# Patient Record
Sex: FEMALE | Race: WHITE | NOT HISPANIC OR LATINO | Employment: OTHER | URBAN - METROPOLITAN AREA
[De-identification: names, ages, dates, MRNs, and addresses within clinical notes are randomized per-mention and may not be internally consistent; named-entity substitution may affect disease eponyms.]

---

## 2017-10-16 ENCOUNTER — OFFICE VISIT (OUTPATIENT)
Dept: OBSTETRICS AND GYNECOLOGY | Age: 57
End: 2017-10-16

## 2017-10-16 VITALS
DIASTOLIC BLOOD PRESSURE: 86 MMHG | SYSTOLIC BLOOD PRESSURE: 122 MMHG | WEIGHT: 143 LBS | BODY MASS INDEX: 20.47 KG/M2 | HEIGHT: 70 IN

## 2017-10-16 DIAGNOSIS — Z01.419 WELL WOMAN EXAM WITH ROUTINE GYNECOLOGICAL EXAM: Primary | ICD-10-CM

## 2017-10-16 DIAGNOSIS — N90.89 LABIAL LESION: ICD-10-CM

## 2017-10-16 DIAGNOSIS — Z11.51 SPECIAL SCREENING EXAMINATION FOR HUMAN PAPILLOMAVIRUS (HPV): ICD-10-CM

## 2017-10-16 LAB
BILIRUB BLD-MCNC: NEGATIVE MG/DL
GLUCOSE UR STRIP-MCNC: NEGATIVE MG/DL
KETONES UR QL: NEGATIVE
LEUKOCYTE EST, POC: NEGATIVE
NITRITE UR-MCNC: NEGATIVE MG/ML
PH UR: 7.5 [PH] (ref 5–8)
PROT UR STRIP-MCNC: NEGATIVE MG/DL
RBC # UR STRIP: NEGATIVE /UL
SP GR UR: 1.02 (ref 1–1.03)
UROBILINOGEN UR QL: NORMAL

## 2017-10-16 PROCEDURE — 81003 URINALYSIS AUTO W/O SCOPE: CPT | Performed by: OBSTETRICS & GYNECOLOGY

## 2017-10-16 PROCEDURE — 99386 PREV VISIT NEW AGE 40-64: CPT | Performed by: OBSTETRICS & GYNECOLOGY

## 2017-10-16 NOTE — PROGRESS NOTES
Routine Annual Visit    10/16/2017    Patient: Xochilt Whittington          MR#:[id number]      Chief Complaint   Patient presents with   • Gynecologic Exam     Xochilt is here for her Annual Exam and Mammogram. She recently moved back from [place].She had pap smear in [place] and was positive for HPV.  She also had heavy cycles and had bx done which was negative.   Her last pap smear at our office was , neg pap, neg HR-HPV. Hx of Ascus in .        History of Present Illness  Recently moved back from [place].  She had a pap smear that was positive for HPV. Also, she was having heavy cycles in  before she went into menopause.  She had bx which were negative.     57 y.o. female  who presents for annual exam.     1.  Menstrual Hx:  Post Menopausal  LMP 2015   No HRT .  She is currently not sexually active however with her last partner in [place] she was having some significant discomfort during intercourse despite the use of lubricant.  We had a long discussion about vaginal estrogen therapy and also full HRT.  Her past history did not indicate a contraindication to full HRT and I strongly recommend that she consider this.  We discussed some of the pros and cons.  After she has had her mammogram report and the labs back regarding these labial lesions I think we'll meet again to discuss full HRT.    2.   History of abnormal Pap smear: yes -  ASCUS ,  , ASCUS with positive HR-HPV, (Dr. Brent Eubanks, [place], [place])        Pap smear Hx:  [id number], normal yearly Paps.  , ASCUS; follow-up , ×2= normal Pap smears.  [id number], normal yearly Paps.  , negative Pap, negative HR-HPV.  2015, negative Pap, positive HR-HPV.  2016, ASCUS, positive HR-HPV.  Colpo with faint white epithelium at 8 to 9:00 and 2 to 3:00.  ECC with benign endocervical glands with squamous metaplasia.  Both cervical biopsies with inflammation and reactive changes.  P 16 immunohistochemical stain  "performed on specimen from 8 to 9:00 showed weak, nonspecific staining, supporting the benign interpretation.    3.  New Medical Hx:  Menopause     4.  New Surgical Hx:  None    5.  New Family Medical Hx:  None     Current contraception: post menopausal status  Exercise:  yes - Six times per week  Family history of uterine or ovarian cancer: no, mother had an early hyst for ? reasons.  Family History of colon cancer: yes - Father, Pt has hx of colon polyps, last scope 4 yr ago, will repeat next year. Dr Lobato.   Family history of breast cancer: no  Perform regular self breast exam: No. But  Will do better.  Mammogram: done today.  Colonoscopy: up to date.  DEXA: up to date.2014, repeat in 2019, Showed mild osteopenia.  We discussed calcium and vitamin D.  I recommended 900-1200 mg of calcium daily and 2000 IUs of vitamin D daily.  She is already regularly exercising.      Review of Systems   Constitutional: Negative.    HENT: Negative.    Eyes: Negative.    Respiratory: Negative.    Cardiovascular: Negative.    Gastrointestinal: Negative.    Endocrine: Negative.    Genitourinary: Negative.    Musculoskeletal: Negative.    Skin: Negative.    Allergic/Immunologic: Negative.    Neurological: Negative.    Hematological: Negative.    Psychiatric/Behavioral: Negative.        Objective     /86  Ht 69.5\" (176.5 cm)  Wt 143 lb (64.9 kg)  Breastfeeding? No Comment: lmp 2015  BMI 20.81 kg/m2    PHYSICAL EXAM    Constitutional: Well-developed, well-nourished, thin  female, in no distress, alert and well oriented ×3.    Skin is warm, dry, without rash.       Neck appears normal, trachea in the midline.  Thyroid exam normal.  No carotid bruits bilaterally.         No cervical lymphadenopathy.    Lungs clear to auscultation.  Chest wall appears normal.    Heart with regular rhythm without murmur or gallop.    Breasts: Does not do regular self breast exams, regular SBE was strongly encouraged.        Right " breast nontender, without dominant mass.  No nipple discharge.            No superficial skin changes.  No axillary adenopathy.        Left breast nontender, without dominant mass.  No nipple discharge.            No superficial skin changes.  No axillary adenopathy.      Abdomen is flat, soft and nontender.No palpable mass.           No hepatosplenomegaly.  Flanks are negative.          Bowel sounds normal.  No abdominal bruit.          No inguinal lymphadenopathy bilaterally.     Pelvic exam :  Vulva normal.           External genitalia is abnormal, with multiple tiny erythematous, ulcerative lesions over the labia minora the clitoral area and some on the labia majora.  These look like herpes and a swab was taken for PCR testing, however these areas are not tender and there is no lymphadenopathy.  She says this happens whenever she uses [facility] for hair removal.          Introitus normal.  Vaginal mucosa normal.            Cervix normal, With HPV, no cervical motion tenderness.          Uterus slightly retroverted, normal size, normal shape, and position.          Adnexa are negative bilaterally.  No tenderness.  No palpable mass.          Rectovaginal exam negative .      Musc /Skel: Lower extremities without edema.     Neuro: Coordination is grossly normal.  Gait is normal.    Psych: Mood and affect is normal.  Behavior normal.  Thought content normal.            Judgment normal.       =All questions were answered.   =Breast self-examination technique was reviewed and the patient encouraged to perform self breast exams monthly.   = We discussed healthy lifestyle modifications.  She is cautious about her diet and exercises regularly.  = We discussed calcium intake needs to prevent osteoporosis.       Assessment/Plan   Xochilt was seen today for gynecologic exam.    Diagnoses and all orders for this visit:    Well woman exam with routine gynecological exam  -     POC Urinalysis Dipstick, Automated  -     PapIG,  HPV, Rfx 16 / 18 - ThinPrep Vial, Cervix    Labial lesion  -     Cancel: HSV 1 Antibody, IgG  -     Cancel: HSV Non-Specific Antibody, IgM  -     HSV 1 & 2 - Specific Antibody, IgG  -     HSV 1 & 2 IgM, Antibodies, Indirect  -     Herpes Simplex Virus (HSV) 1 & 2, DENTON - Swab, Vulva    Special screening examination for human papillomavirus (HPV)  -     PapIG, HPV, Rfx 16 / 18 - ThinPrep Vial, Cervix      COMMENTS: We'll discuss full HRT after mammogram report and labs back.    Good Grewal MD  10/16/2017

## 2017-10-18 LAB
CYTOLOGIST CVX/VAG CYTO: NORMAL
CYTOLOGY CVX/VAG DOC THIN PREP: NORMAL
DX ICD CODE: NORMAL
HIV 1 & 2 AB SER-IMP: NORMAL
HPV I/H RISK 1 DNA CVX QL PROBE+SIG AMP: NEGATIVE
HSV1 IGG SER IA-ACNC: 28.6 INDEX (ref 0–0.9)
HSV1 IGM TITR SER IF: NORMAL TITER
HSV2 IGG SER IA-ACNC: <0.91 INDEX (ref 0–0.9)
HSV2 IGM TITR SER IF: NORMAL TITER
OTHER STN SPEC: NORMAL
PATH REPORT.FINAL DX SPEC: NORMAL
STAT OF ADQ CVX/VAG CYTO-IMP: NORMAL

## 2017-10-19 LAB
HSV1 DNA SPEC QL NAA+PROBE: NEGATIVE
HSV2 DNA SPEC QL NAA+PROBE: NEGATIVE

## 2017-10-19 NOTE — PROGRESS NOTES
Notify gail that the herpes test of the lesions was negative for HSV 1&2, but the IgG blood test was positive for HSV-1, neg for HSV-2, and the IgM was neg for both. 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

## 2017-11-22 ENCOUNTER — TELEPHONE (OUTPATIENT)
Dept: OBSTETRICS AND GYNECOLOGY | Age: 57
End: 2017-11-22

## 2017-11-29 RX ORDER — ESTRADIOL AND NORETHINDRONE ACETATE .5; .1 MG/1; MG/1
1 TABLET ORAL DAILY
Qty: 30 TABLET | Refills: 12 | Status: SHIPPED | OUTPATIENT
Start: 2017-11-29 | End: 2018-03-08 | Stop reason: SDUPTHER

## 2017-12-05 ENCOUNTER — TRANSCRIBE ORDERS (OUTPATIENT)
Dept: ADMINISTRATIVE | Facility: HOSPITAL | Age: 57
End: 2017-12-05

## 2017-12-05 DIAGNOSIS — Z00.00 ROUTINE GENERAL MEDICAL EXAMINATION AT A HEALTH CARE FACILITY: Primary | ICD-10-CM

## 2018-01-08 ENCOUNTER — TELEPHONE (OUTPATIENT)
Dept: OBSTETRICS AND GYNECOLOGY | Age: 58
End: 2018-01-08

## 2018-01-08 NOTE — TELEPHONE ENCOUNTER
Pt reluctant to do HRT bc of increased risk of breast cancer. Pt wants to know if the estrogen cream can increase risk of cervical cancer? Can she do premarin, would that be appropriate/have same risk? Are there any other compounding pharms you would recommend? If these other options are not suitable for her, she will need a refill on the cream.    Pt # [phone]

## 2018-01-15 ENCOUNTER — TELEPHONE (OUTPATIENT)
Dept: OBSTETRICS AND GYNECOLOGY | Age: 58
End: 2018-01-15

## 2018-03-08 ENCOUNTER — TELEPHONE (OUTPATIENT)
Dept: OBSTETRICS AND GYNECOLOGY | Age: 58
End: 2018-03-08

## 2018-03-08 DIAGNOSIS — Z78.0 MENOPAUSE: Primary | ICD-10-CM

## 2018-03-08 RX ORDER — ESTRADIOL 0.03 MG/D
1 FILM, EXTENDED RELEASE TRANSDERMAL 2 TIMES WEEKLY
Qty: 8 PATCH | Refills: 1 | Status: SHIPPED | OUTPATIENT
Start: 2018-03-08 | End: 2018-04-02 | Stop reason: SDUPTHER

## 2018-04-02 ENCOUNTER — TELEPHONE (OUTPATIENT)
Dept: OBSTETRICS AND GYNECOLOGY | Age: 58
End: 2018-04-02

## 2018-04-02 DIAGNOSIS — Z78.0 MENOPAUSE: ICD-10-CM

## 2018-04-02 RX ORDER — ESTRADIOL 0.03 MG/D
1 FILM, EXTENDED RELEASE TRANSDERMAL 2 TIMES WEEKLY
Qty: 8 PATCH | Refills: 11 | Status: SHIPPED | OUTPATIENT
Start: 2018-04-02 | End: 2018-10-10 | Stop reason: SDUPTHER

## 2018-04-02 NOTE — TELEPHONE ENCOUNTER
Pt did the Vivelle Dot patch for a month and states she is doing fine on it. Pt is wanting to get a year prescription and is also needing progesterone called into pharmacy.

## 2018-06-19 ENCOUNTER — TELEPHONE (OUTPATIENT)
Dept: OBSTETRICS AND GYNECOLOGY | Age: 58
End: 2018-06-19

## 2018-06-19 NOTE — TELEPHONE ENCOUNTER
Dr JUDD put pt on hormone patch and provera for her bones and heart risks due to her age, she has not had any problems just asking if she needs to follow up with Dr JUDD after certain period of time?

## 2018-06-20 NOTE — TELEPHONE ENCOUNTER
Why don't we schedule a follow-up consult in 3 months, to review how she is doing and address any problems she may have.

## 2018-06-22 NOTE — TELEPHONE ENCOUNTER
June 22, 2018.  7:54 PM.  I called Xochilt this evening.  We started HRT because of the benefits to her, not to treat any specific symptoms.  She really hasn't noticed any difference in how she feels but wasn't having any problems beforehand.  So she knows to call me if she develops any issues otherwise I'll just see her October 31 for her annual exam.

## 2018-08-21 ENCOUNTER — PREP FOR SURGERY (OUTPATIENT)
Dept: OTHER | Facility: HOSPITAL | Age: 58
End: 2018-08-21

## 2018-08-21 DIAGNOSIS — Z80.0 FH: COLON CANCER IN FIRST DEGREE RELATIVE <60 YEARS OLD: ICD-10-CM

## 2018-08-21 DIAGNOSIS — Z86.010 HX OF COLONIC POLYPS: Primary | ICD-10-CM

## 2018-08-21 DIAGNOSIS — Z83.71 FH: COLON POLYPS: ICD-10-CM

## 2018-08-25 ENCOUNTER — HOSPITAL ENCOUNTER (EMERGENCY)
Facility: HOSPITAL | Age: 58
Discharge: HOME | End: 2018-08-25
Attending: EMERGENCY MEDICINE | Admitting: EMERGENCY MEDICINE

## 2018-08-25 ENCOUNTER — APPOINTMENT (OUTPATIENT)
Dept: GENERAL RADIOLOGY | Facility: HOSPITAL | Age: 58
End: 2018-08-25

## 2018-08-25 ENCOUNTER — APPOINTMENT (OUTPATIENT)
Dept: CARDIOLOGY | Facility: HOSPITAL | Age: 58
End: 2018-08-25
Attending: EMERGENCY MEDICINE

## 2018-08-25 VITALS
TEMPERATURE: 98.1 F | DIASTOLIC BLOOD PRESSURE: 47 MMHG | BODY MASS INDEX: 21.19 KG/M2 | RESPIRATION RATE: 20 BRPM | OXYGEN SATURATION: 100 % | HEIGHT: 70 IN | WEIGHT: 148 LBS | HEART RATE: 58 BPM | SYSTOLIC BLOOD PRESSURE: 118 MMHG

## 2018-08-25 DIAGNOSIS — I48.0 PAROXYSMAL ATRIAL FIBRILLATION (HCC): Primary | ICD-10-CM

## 2018-08-25 LAB
ALBUMIN SERPL-MCNC: 4.7 G/DL (ref 3.5–5.2)
ALBUMIN/GLOB SERPL: 1.3 G/DL
ALP SERPL-CCNC: 92 U/L (ref 39–117)
ALT SERPL W P-5'-P-CCNC: 17 U/L (ref 1–33)
ANION GAP SERPL CALCULATED.3IONS-SCNC: 14.2 MMOL/L
AST SERPL-CCNC: 18 U/L (ref 1–32)
BASOPHILS # BLD AUTO: 0.04 10*3/MM3 (ref 0–0.2)
BASOPHILS NFR BLD AUTO: 0.5 % (ref 0–1.5)
BILIRUB SERPL-MCNC: 1 MG/DL (ref 0.1–1.2)
BUN BLD-MCNC: 26 MG/DL (ref 6–20)
BUN/CREAT SERPL: 22.8 (ref 7–25)
CALCIUM SPEC-SCNC: 10.8 MG/DL (ref 8.6–10.5)
CHLORIDE SERPL-SCNC: 103 MMOL/L (ref 98–107)
CO2 SERPL-SCNC: 24.8 MMOL/L (ref 22–29)
CREAT BLD-MCNC: 1.14 MG/DL (ref 0.57–1)
DEPRECATED RDW RBC AUTO: 45.9 FL (ref 37–54)
EOSINOPHIL # BLD AUTO: 0.09 10*3/MM3 (ref 0–0.7)
EOSINOPHIL NFR BLD AUTO: 1.1 % (ref 0.3–6.2)
ERYTHROCYTE [DISTWIDTH] IN BLOOD BY AUTOMATED COUNT: 13.5 % (ref 11.7–13)
GFR SERPL CREATININE-BSD FRML MDRD: 49 ML/MIN/1.73
GLOBULIN UR ELPH-MCNC: 3.6 GM/DL
GLUCOSE BLD-MCNC: 95 MG/DL (ref 65–99)
HCT VFR BLD AUTO: 48.9 % (ref 35.6–45.5)
HGB BLD-MCNC: 16.2 G/DL (ref 11.9–15.5)
IMM GRANULOCYTES # BLD: 0.01 10*3/MM3 (ref 0–0.03)
IMM GRANULOCYTES NFR BLD: 0.1 % (ref 0–0.5)
INR PPP: 1.03 (ref 0.9–1.1)
LYMPHOCYTES # BLD AUTO: 1.61 10*3/MM3 (ref 0.9–4.8)
LYMPHOCYTES NFR BLD AUTO: 19.7 % (ref 19.6–45.3)
MAGNESIUM SERPL-MCNC: 2.3 MG/DL (ref 1.6–2.6)
MCH RBC QN AUTO: 30.8 PG (ref 26.9–32)
MCHC RBC AUTO-ENTMCNC: 33.1 G/DL (ref 32.4–36.3)
MCV RBC AUTO: 93 FL (ref 80.5–98.2)
MONOCYTES # BLD AUTO: 0.57 10*3/MM3 (ref 0.2–1.2)
MONOCYTES NFR BLD AUTO: 7 % (ref 5–12)
NEUTROPHILS # BLD AUTO: 5.88 10*3/MM3 (ref 1.9–8.1)
NEUTROPHILS NFR BLD AUTO: 71.7 % (ref 42.7–76)
PLATELET # BLD AUTO: 261 10*3/MM3 (ref 140–500)
PMV BLD AUTO: 11 FL (ref 6–12)
POTASSIUM BLD-SCNC: 3.9 MMOL/L (ref 3.5–5.2)
PROT SERPL-MCNC: 8.3 G/DL (ref 6–8.5)
PROTHROMBIN TIME: 13.3 SECONDS (ref 11.7–14.2)
RBC # BLD AUTO: 5.26 10*6/MM3 (ref 3.9–5.2)
SODIUM BLD-SCNC: 142 MMOL/L (ref 136–145)
T4 FREE SERPL-MCNC: 1.24 NG/DL (ref 0.93–1.7)
TROPONIN T SERPL-MCNC: <0.01 NG/ML (ref 0–0.03)
TSH SERPL DL<=0.05 MIU/L-ACNC: 1.34 MIU/ML (ref 0.27–4.2)
WBC NRBC COR # BLD: 8.19 10*3/MM3 (ref 4.5–10.7)

## 2018-08-25 PROCEDURE — 71045 X-RAY EXAM CHEST 1 VIEW: CPT

## 2018-08-25 PROCEDURE — 80053 COMPREHEN METABOLIC PANEL: CPT | Performed by: EMERGENCY MEDICINE

## 2018-08-25 PROCEDURE — 0296T HC EXT ECG > 48HR TO 21 DAY RCRD W/CONECT INTL RCRD: CPT

## 2018-08-25 PROCEDURE — 93005 ELECTROCARDIOGRAM TRACING: CPT | Performed by: EMERGENCY MEDICINE

## 2018-08-25 PROCEDURE — 96374 THER/PROPH/DIAG INJ IV PUSH: CPT

## 2018-08-25 PROCEDURE — 93010 ELECTROCARDIOGRAM REPORT: CPT | Performed by: INTERNAL MEDICINE

## 2018-08-25 PROCEDURE — 84484 ASSAY OF TROPONIN QUANT: CPT | Performed by: EMERGENCY MEDICINE

## 2018-08-25 PROCEDURE — 84443 ASSAY THYROID STIM HORMONE: CPT | Performed by: EMERGENCY MEDICINE

## 2018-08-25 PROCEDURE — 99284 EMERGENCY DEPT VISIT MOD MDM: CPT

## 2018-08-25 PROCEDURE — 85025 COMPLETE CBC W/AUTO DIFF WBC: CPT | Performed by: EMERGENCY MEDICINE

## 2018-08-25 PROCEDURE — 85610 PROTHROMBIN TIME: CPT | Performed by: EMERGENCY MEDICINE

## 2018-08-25 PROCEDURE — 83735 ASSAY OF MAGNESIUM: CPT | Performed by: EMERGENCY MEDICINE

## 2018-08-25 PROCEDURE — 84439 ASSAY OF FREE THYROXINE: CPT | Performed by: EMERGENCY MEDICINE

## 2018-08-25 PROCEDURE — 96361 HYDRATE IV INFUSION ADD-ON: CPT

## 2018-08-25 RX ORDER — METOPROLOL SUCCINATE 25 MG/1
25 TABLET, EXTENDED RELEASE ORAL DAILY
Qty: 30 TABLET | Refills: 0 | Status: SHIPPED | OUTPATIENT
Start: 2018-08-25 | End: 2018-08-29

## 2018-08-25 RX ADMIN — METOPROLOL TARTRATE 2.5 MG: 5 INJECTION, SOLUTION INTRAVENOUS at 13:04

## 2018-08-25 RX ADMIN — SODIUM CHLORIDE 1000 ML: 9 INJECTION, SOLUTION INTRAVENOUS at 12:55

## 2018-08-29 ENCOUNTER — APPOINTMENT (OUTPATIENT)
Dept: GENERAL RADIOLOGY | Facility: HOSPITAL | Age: 58
End: 2018-08-29

## 2018-08-29 ENCOUNTER — HOSPITAL ENCOUNTER (EMERGENCY)
Facility: HOSPITAL | Age: 58
Discharge: HOME | End: 2018-08-29
Attending: EMERGENCY MEDICINE | Admitting: EMERGENCY MEDICINE

## 2018-08-29 VITALS
SYSTOLIC BLOOD PRESSURE: 138 MMHG | WEIGHT: 139 LBS | DIASTOLIC BLOOD PRESSURE: 82 MMHG | HEART RATE: 59 BPM | BODY MASS INDEX: 19.9 KG/M2 | TEMPERATURE: 97.5 F | HEIGHT: 70 IN | OXYGEN SATURATION: 99 % | RESPIRATION RATE: 18 BRPM

## 2018-08-29 DIAGNOSIS — R53.1 GENERALIZED WEAKNESS: Primary | ICD-10-CM

## 2018-08-29 DIAGNOSIS — R00.1 BRADYCARDIA: ICD-10-CM

## 2018-08-29 LAB
ALBUMIN SERPL-MCNC: 4 G/DL (ref 3.5–5.2)
ALBUMIN/GLOB SERPL: 1.4 G/DL
ALP SERPL-CCNC: 69 U/L (ref 39–117)
ALT SERPL W P-5'-P-CCNC: 18 U/L (ref 1–33)
ANION GAP SERPL CALCULATED.3IONS-SCNC: 8.6 MMOL/L
AST SERPL-CCNC: 17 U/L (ref 1–32)
BASOPHILS # BLD AUTO: 0.05 10*3/MM3 (ref 0–0.2)
BASOPHILS NFR BLD AUTO: 0.8 % (ref 0–1.5)
BILIRUB SERPL-MCNC: 0.5 MG/DL (ref 0.1–1.2)
BUN BLD-MCNC: 20 MG/DL (ref 6–20)
BUN/CREAT SERPL: 23.5 (ref 7–25)
CALCIUM SPEC-SCNC: 9.9 MG/DL (ref 8.6–10.5)
CHLORIDE SERPL-SCNC: 104 MMOL/L (ref 98–107)
CO2 SERPL-SCNC: 27.4 MMOL/L (ref 22–29)
CREAT BLD-MCNC: 0.85 MG/DL (ref 0.57–1)
DEPRECATED RDW RBC AUTO: 47.3 FL (ref 37–54)
EOSINOPHIL # BLD AUTO: 0.12 10*3/MM3 (ref 0–0.7)
EOSINOPHIL NFR BLD AUTO: 2 % (ref 0.3–6.2)
ERYTHROCYTE [DISTWIDTH] IN BLOOD BY AUTOMATED COUNT: 13.7 % (ref 11.7–13)
GFR SERPL CREATININE-BSD FRML MDRD: 68 ML/MIN/1.73
GLOBULIN UR ELPH-MCNC: 2.9 GM/DL
GLUCOSE BLD-MCNC: 92 MG/DL (ref 65–99)
HCT VFR BLD AUTO: 43.3 % (ref 35.6–45.5)
HGB BLD-MCNC: 13.4 G/DL (ref 11.9–15.5)
IMM GRANULOCYTES # BLD: 0 10*3/MM3 (ref 0–0.03)
IMM GRANULOCYTES NFR BLD: 0 % (ref 0–0.5)
LYMPHOCYTES # BLD AUTO: 1.58 10*3/MM3 (ref 0.9–4.8)
LYMPHOCYTES NFR BLD AUTO: 25.7 % (ref 19.6–45.3)
MCH RBC QN AUTO: 29.5 PG (ref 26.9–32)
MCHC RBC AUTO-ENTMCNC: 30.9 G/DL (ref 32.4–36.3)
MCV RBC AUTO: 95.2 FL (ref 80.5–98.2)
MONOCYTES # BLD AUTO: 0.42 10*3/MM3 (ref 0.2–1.2)
MONOCYTES NFR BLD AUTO: 6.8 % (ref 5–12)
NEUTROPHILS # BLD AUTO: 3.98 10*3/MM3 (ref 1.9–8.1)
NEUTROPHILS NFR BLD AUTO: 64.7 % (ref 42.7–76)
PLATELET # BLD AUTO: 215 10*3/MM3 (ref 140–500)
PMV BLD AUTO: 11 FL (ref 6–12)
POTASSIUM BLD-SCNC: 4.4 MMOL/L (ref 3.5–5.2)
PROT SERPL-MCNC: 6.9 G/DL (ref 6–8.5)
RBC # BLD AUTO: 4.55 10*6/MM3 (ref 3.9–5.2)
SODIUM BLD-SCNC: 140 MMOL/L (ref 136–145)
TROPONIN T SERPL-MCNC: <0.01 NG/ML (ref 0–0.03)
WBC NRBC COR # BLD: 6.15 10*3/MM3 (ref 4.5–10.7)

## 2018-08-29 PROCEDURE — 71046 X-RAY EXAM CHEST 2 VIEWS: CPT

## 2018-08-29 PROCEDURE — 84484 ASSAY OF TROPONIN QUANT: CPT | Performed by: NURSE PRACTITIONER

## 2018-08-29 PROCEDURE — 85025 COMPLETE CBC W/AUTO DIFF WBC: CPT | Performed by: NURSE PRACTITIONER

## 2018-08-29 PROCEDURE — 93005 ELECTROCARDIOGRAM TRACING: CPT | Performed by: EMERGENCY MEDICINE

## 2018-08-29 PROCEDURE — 93005 ELECTROCARDIOGRAM TRACING: CPT

## 2018-08-29 PROCEDURE — 99283 EMERGENCY DEPT VISIT LOW MDM: CPT

## 2018-08-29 PROCEDURE — 80053 COMPREHEN METABOLIC PANEL: CPT | Performed by: NURSE PRACTITIONER

## 2018-08-29 RX ORDER — SODIUM CHLORIDE 0.9 % (FLUSH) 0.9 %
10 SYRINGE (ML) INJECTION AS NEEDED
Status: DISCONTINUED | OUTPATIENT
Start: 2018-08-29 | End: 2018-08-29 | Stop reason: HOSPADM

## 2018-09-04 ENCOUNTER — OFFICE VISIT (OUTPATIENT)
Dept: CARDIOLOGY | Facility: CLINIC | Age: 58
End: 2018-09-04

## 2018-09-04 VITALS
BODY MASS INDEX: 21.9 KG/M2 | DIASTOLIC BLOOD PRESSURE: 82 MMHG | SYSTOLIC BLOOD PRESSURE: 110 MMHG | HEART RATE: 60 BPM | HEIGHT: 70 IN | WEIGHT: 153 LBS

## 2018-09-04 DIAGNOSIS — I48.0 PAROXYSMAL ATRIAL FIBRILLATION (HCC): Primary | ICD-10-CM

## 2018-09-04 PROCEDURE — 99204 OFFICE O/P NEW MOD 45 MIN: CPT | Performed by: INTERNAL MEDICINE

## 2018-09-04 NOTE — PROGRESS NOTES
Subjective:     Encounter Date:09/04/2018      Patient ID: Xochilt Whittington is a 57 y.o. female.    Chief Complaint: PAF  History of Present Illness    Dear Dr. Llanes,    I had the pleasure of seeing this patient in the office today for initial evaluation and consultation.  I appreciate the you ask her to see us.    She is a pleasant female who was recently in the emergency room on August 18 with an episode of paroxysmal atrial fibrillation.  She was playing pickle ball and suddenly developed tachycardia.  She felt very weak and very drained.  She felt like her heart was in a jump out of her chest.  She says she has never felt anything like this before.  She came the emergency room and was found to be in atrial fibrillation.  She was also found to be significantly dehydrated by her lab work.  She was given IV fluid and spontaneously converted to sinus rhythm.  She does admit that she sometimes goes and doesn't drink much water and she knows that she was really dehydrated that day.  They discharged her with a monitor which she is still wearing.  She has not felt any other episodes of tachycardia or arrhythmia.    She came back to the emergency room several days later because of fatigue and they cut her metoprolol in half.    This patient denies any other cardiac complaints.  This patient denies any chest pain, pressure, tightness, squeezing, or heartburn. There has not been any problems with dizziness or lightheadedness.  There has not been any orthopnea or PND, and no problems with lower extremity edema.  This patient denies any shortness of breath at rest or with activity and has not had any wheezing.  This patient has not had any problems with unexplained nausea or vomiting. The patient has continued to perform daily activities of living without any specific problem or change in the level of activity.  This patient has not been recently hospitalized for any reason.    This patient has no known cardiac  "history.  This patient has no history of coronary artery disease, congestive heart failure, rheumatic fever, rheumatic heart disease, congenital heart disease or heart murmur.  This patient has never required invasive cardiovascular evaluation.    The following portions of the patient's history were reviewed and updated as appropriate: allergies, current medications, past family history, past medical history, past social history, past surgical history and problem list.    Past Medical History:   Diagnosis Date   • Anxiety and depression     was on antidepresssants, ended about a year ago, 2016.   • Atypical squamous cells of undetermined significance (ASCUS) on Papanicolaou smear of cervix 1998   • Encounter for IUD removal 05/13/2014    removed due to spotting with mirena  FSH = 50.6   • Eye injury     right eye (as a child)   • Family history of colon cancer     Father had colon ca in his early 50's.   • History of Papanicolaou smear of cervix 2016 2016, Pap w/ ASCUS, +HPV.  2015, Positive HPV endometrial cells present.  2014, Negative pap, negative HR-HPV.    • Insomnia    • PAF (paroxysmal atrial fibrillation) (CMS/HCC)    • PCB (post coital bleeding)    • PMB (postmenopausal bleeding)    • Pyelonephritis 07/13/2004    Right    • Vaginal delivery     1988  \"JOAN\"    1992  \"PAUL\"       Past Surgical History:   Procedure Laterality Date   • BREAST BIOPSY Right 1989    20 yr ago, benign clogged duct.   • COLONOSCOPY W/ BIOPSIES  2013    x3  hx of polyps and family history   due in 2018   • DENTAL PROCEDURE     • HIP SURGERY      @ age 14: Fx hip: [;aced hip pins, then removed.   • TONSILLECTOMY         Social History     Social History   • Marital status: Single     Spouse name: N/A   • Number of children: 2   • Years of education: N/A     Occupational History   • Not on file.     Social History Main Topics   • Smoking status: Never Smoker   • Smokeless tobacco: Never Used      Comment: caff use   • " "Alcohol use No   • Drug use: No   • Sexual activity: Not on file     Other Topics Concern   • Not on file     Social History Narrative   • No narrative on file       Review of Systems   Constitution: Negative for chills, decreased appetite, fever and night sweats.   HENT: Negative for ear discharge, ear pain, hearing loss, nosebleeds and sore throat.    Eyes: Negative for blurred vision, double vision and pain.   Cardiovascular: Negative for cyanosis.   Respiratory: Negative for hemoptysis and sputum production.    Endocrine: Negative for cold intolerance and heat intolerance.   Hematologic/Lymphatic: Negative for adenopathy.   Skin: Negative for dry skin, itching, nail changes, rash and suspicious lesions.   Musculoskeletal: Negative for arthritis, gout, muscle cramps, muscle weakness, myalgias and neck pain.   Gastrointestinal: Negative for anorexia, bowel incontinence, constipation, diarrhea, dysphagia, hematemesis and jaundice.   Genitourinary: Negative for bladder incontinence, dysuria, flank pain, frequency, hematuria and nocturia.   Neurological: Negative for focal weakness, numbness, paresthesias and seizures.   Psychiatric/Behavioral: Negative for altered mental status, hallucinations, hypervigilance, suicidal ideas and thoughts of violence.   Allergic/Immunologic: Negative for persistent infections.       Procedures       Objective:     Vitals:    09/04/18 1050   BP: 110/82   Pulse: 60   Weight: 69.4 kg (153 lb)   Height: 177.8 cm (70\")         Physical Exam   Constitutional: She is oriented to person, place, and time. She appears well-developed and well-nourished. No distress.   HENT:   Head: Normocephalic and atraumatic.   Nose: Nose normal.   Mouth/Throat: Oropharynx is clear and moist.   Eyes: Pupils are equal, round, and reactive to light. Conjunctivae and EOM are normal. Right eye exhibits no discharge. Left eye exhibits no discharge.   Neck: Normal range of motion. Neck supple. No tracheal deviation " present. No thyromegaly present.   Cardiovascular: Normal rate, regular rhythm, S1 normal, S2 normal, normal heart sounds and normal pulses.  Exam reveals no S3.    Pulmonary/Chest: Effort normal and breath sounds normal. No stridor. No respiratory distress. She exhibits no tenderness.   Abdominal: Soft. Bowel sounds are normal. She exhibits no distension and no mass. There is no tenderness. There is no rebound and no guarding.   Musculoskeletal: Normal range of motion. She exhibits no tenderness or deformity.   Lymphadenopathy:     She has no cervical adenopathy.   Neurological: She is alert and oriented to person, place, and time. She has normal reflexes.   Skin: Skin is warm and dry. No rash noted. She is not diaphoretic. No erythema.   Psychiatric: She has a normal mood and affect. Thought content normal.       Lab Review:     Results from last 7 days  Lab Units 08/29/18  1454   SODIUM mmol/L 140   POTASSIUM mmol/L 4.4   CHLORIDE mmol/L 104   CO2 mmol/L 27.4   BUN mg/dL 20   CREATININE mg/dL 0.85   GLUCOSE mg/dL 92   CALCIUM mg/dL 9.9           Performed        Assessment:          Diagnosis Plan   1. Paroxysmal atrial fibrillation (CMS/HCC)  Adult Transthoracic Echo Complete W/ Cont if Necessary Per Protocol          Plan:       Atrial Fibrillation and Atrial Flutter  Assessment  • The patient has paroxysmal atrial fibrillation  • This is non-valvular in etiology  • The patient's CHADS2-VASc score is 0  • A [id number] score of 0 is considered a low risk for a thromboembolic event    Plan  • Continue beta blocker for rhythm control  We will obtain an echocardiogram.  I will await the results of her Holter monitor.  This occurred in the setting of severe dehydration.  Her repeat blood work several days later after IV fluid and attention to hydration showed resolution of her dehydration.    Thank you very much for allowing us to participate in the care of this pleasant patient.  Please don't hesitate to call if  I can be of assistance in any way.      Current Outpatient Prescriptions:   •  estradiol (VIVELLE-DOT) 0.025 MG/24HR patch, Place 1 patch on the skin 2 (Two) Times a Week., Disp: 8 patch, Rfl: 11  •  fluconazole (DIFLUCAN) 150 MG tablet, Take 150 mg by mouth 1 (One) Time Per Week., Disp: , Rfl:   •  metoprolol tartrate (LOPRESSOR) 25 MG tablet, Take 0.5 tablets by mouth every night at bedtime., Disp: 20 tablet, Rfl: 0  •  progesterone (PROMETRIUM) 100 MG capsule, Take 1 capsule by mouth every night at bedtime., Disp: 90 capsule, Rfl: 3         EMR Dragon/Transcription disclaimer:    Much of this encounter note is an electronic transcription/translation of spoken language to printed text. The electronic translation of spoken language may permit erroneous, or at times, nonsensical words or phrases to be inadvertently transcribed; Although I have reviewed the note for such errors, some may still exist.

## 2018-09-07 ENCOUNTER — TELEPHONE (OUTPATIENT)
Dept: CARDIOLOGY | Facility: CLINIC | Age: 58
End: 2018-09-07

## 2018-09-07 NOTE — TELEPHONE ENCOUNTER
Okay for her to remain on that once a day for now, which make her an appointment to see here in the office in the next 1-2 weeks for follow-up.  We can then prescribe whatever is indicated.

## 2018-09-07 NOTE — TELEPHONE ENCOUNTER
Pt said she was in the ER 08-29-18 for weakness and bradycardia and they changed her Metoprolol succinate XL 25 mg 24 hr tab to Metoprolol Tartrate 25 mg and was told to cut it in half and take it at night.  Since it has a shorter acting period she wants to know if she should take it during the day?  She wants to get the best benefit from it.  She will also need you to send in a new Rx for the drug.    Thanks,  Gisell Graves can be reached @ [phone]

## 2018-09-10 NOTE — TELEPHONE ENCOUNTER
I scheduled pt. She said she is needing a refill on her metoprolol - send to [facility] on [address]. and also has questions regarding medication and is requesting you call her.    [phone]    Thank you  Rex JUDD

## 2018-09-10 NOTE — TELEPHONE ENCOUNTER
Cherelle  Pt has an appointment in December and Dr. Barba said 1-2 weeks so I'm sending this back to you so you can decide where to put her.      Thanks,  Gisell

## 2018-09-12 ENCOUNTER — HOSPITAL ENCOUNTER (OUTPATIENT)
Dept: CARDIOLOGY | Facility: HOSPITAL | Age: 58
Discharge: HOME | End: 2018-09-12
Attending: INTERNAL MEDICINE | Admitting: INTERNAL MEDICINE

## 2018-09-12 VITALS
HEIGHT: 70 IN | WEIGHT: 153 LBS | SYSTOLIC BLOOD PRESSURE: 110 MMHG | BODY MASS INDEX: 21.9 KG/M2 | HEART RATE: 70 BPM | DIASTOLIC BLOOD PRESSURE: 74 MMHG

## 2018-09-12 DIAGNOSIS — I48.0 PAROXYSMAL ATRIAL FIBRILLATION (HCC): ICD-10-CM

## 2018-09-12 LAB
ASCENDING AORTA: 3 CM
BH CV ECHO MEAS - ACS: 1.9 CM
BH CV ECHO MEAS - AO MAX PG (FULL): 2.7 MMHG
BH CV ECHO MEAS - AO MAX PG: 4.9 MMHG
BH CV ECHO MEAS - AO MEAN PG (FULL): 1.7 MMHG
BH CV ECHO MEAS - AO MEAN PG: 2.7 MMHG
BH CV ECHO MEAS - AO ROOT AREA (BSA CORRECTED): 1.3
BH CV ECHO MEAS - AO ROOT AREA: 4.7 CM^2
BH CV ECHO MEAS - AO ROOT DIAM: 2.4 CM
BH CV ECHO MEAS - AO V2 MAX: 110.5 CM/SEC
BH CV ECHO MEAS - AO V2 MEAN: 78.7 CM/SEC
BH CV ECHO MEAS - AO V2 VTI: 28.5 CM
BH CV ECHO MEAS - AVA(I,A): 1.6 CM^2
BH CV ECHO MEAS - AVA(I,D): 1.6 CM^2
BH CV ECHO MEAS - AVA(V,A): 1.9 CM^2
BH CV ECHO MEAS - AVA(V,D): 1.9 CM^2
BH CV ECHO MEAS - BSA(HAYCOCK): 1.8 M^2
BH CV ECHO MEAS - BSA: 1.9 M^2
BH CV ECHO MEAS - BZI_BMI: 22 KILOGRAMS/M^2
BH CV ECHO MEAS - BZI_METRIC_HEIGHT: 177.8 CM
BH CV ECHO MEAS - BZI_METRIC_WEIGHT: 69.4 KG
BH CV ECHO MEAS - EDV(MOD-SP2): 62 ML
BH CV ECHO MEAS - EDV(MOD-SP4): 73 ML
BH CV ECHO MEAS - EDV(TEICH): 94.7 ML
BH CV ECHO MEAS - EF(CUBED): 70 %
BH CV ECHO MEAS - EF(MOD-BP): 69 %
BH CV ECHO MEAS - EF(MOD-SP2): 67.7 %
BH CV ECHO MEAS - EF(MOD-SP4): 69.9 %
BH CV ECHO MEAS - EF(TEICH): 61.7 %
BH CV ECHO MEAS - ESV(MOD-SP2): 20 ML
BH CV ECHO MEAS - ESV(MOD-SP4): 22 ML
BH CV ECHO MEAS - ESV(TEICH): 36.2 ML
BH CV ECHO MEAS - FS: 33.1 %
BH CV ECHO MEAS - IVS/LVPW: 0.95
BH CV ECHO MEAS - IVSD: 0.72 CM
BH CV ECHO MEAS - LAT PEAK E' VEL: 5 CM/SEC
BH CV ECHO MEAS - LV DIASTOLIC VOL/BSA (35-75): 39.2 ML/M^2
BH CV ECHO MEAS - LV MASS(C)D: 103.4 GRAMS
BH CV ECHO MEAS - LV MASS(C)DI: 55.5 GRAMS/M^2
BH CV ECHO MEAS - LV MAX PG: 2.1 MMHG
BH CV ECHO MEAS - LV MEAN PG: 0.97 MMHG
BH CV ECHO MEAS - LV SYSTOLIC VOL/BSA (12-30): 11.8 ML/M^2
BH CV ECHO MEAS - LV V1 MAX: 73.2 CM/SEC
BH CV ECHO MEAS - LV V1 MEAN: 45.7 CM/SEC
BH CV ECHO MEAS - LV V1 VTI: 16.6 CM
BH CV ECHO MEAS - LVIDD: 4.5 CM
BH CV ECHO MEAS - LVIDS: 3 CM
BH CV ECHO MEAS - LVLD AP2: 7.4 CM
BH CV ECHO MEAS - LVLD AP4: 7.2 CM
BH CV ECHO MEAS - LVLS AP2: 6 CM
BH CV ECHO MEAS - LVLS AP4: 5.9 CM
BH CV ECHO MEAS - LVOT AREA (M): 2.8 CM^2
BH CV ECHO MEAS - LVOT AREA: 2.8 CM^2
BH CV ECHO MEAS - LVOT DIAM: 1.9 CM
BH CV ECHO MEAS - LVPWD: 0.75 CM
BH CV ECHO MEAS - MED PEAK E' VEL: 7 CM/SEC
BH CV ECHO MEAS - MR MAX PG: 56.9 MMHG
BH CV ECHO MEAS - MR MAX VEL: 377.2 CM/SEC
BH CV ECHO MEAS - MV A DUR: 0.15 SEC
BH CV ECHO MEAS - MV A MAX VEL: 41.7 CM/SEC
BH CV ECHO MEAS - MV DEC SLOPE: 413.3 CM/SEC^2
BH CV ECHO MEAS - MV DEC TIME: 0.17 SEC
BH CV ECHO MEAS - MV E MAX VEL: 64.5 CM/SEC
BH CV ECHO MEAS - MV E/A: 1.5
BH CV ECHO MEAS - MV MAX PG: 2.2 MMHG
BH CV ECHO MEAS - MV MEAN PG: 0.72 MMHG
BH CV ECHO MEAS - MV P1/2T MAX VEL: 66.5 CM/SEC
BH CV ECHO MEAS - MV P1/2T: 47.1 MSEC
BH CV ECHO MEAS - MV V2 MAX: 73.6 CM/SEC
BH CV ECHO MEAS - MV V2 MEAN: 39.5 CM/SEC
BH CV ECHO MEAS - MV V2 VTI: 27.6 CM
BH CV ECHO MEAS - MVA P1/2T LCG: 3.3 CM^2
BH CV ECHO MEAS - MVA(P1/2T): 4.7 CM^2
BH CV ECHO MEAS - MVA(VTI): 1.7 CM^2
BH CV ECHO MEAS - PA ACC TIME: 0.13 SEC
BH CV ECHO MEAS - PA MAX PG (FULL): 0.76 MMHG
BH CV ECHO MEAS - PA MAX PG: 2.5 MMHG
BH CV ECHO MEAS - PA PR(ACCEL): 18.8 MMHG
BH CV ECHO MEAS - PA V2 MAX: 78.7 CM/SEC
BH CV ECHO MEAS - PULM A REVS DUR: 0.13 SEC
BH CV ECHO MEAS - PULM A REVS VEL: 30.1 CM/SEC
BH CV ECHO MEAS - PULM DIAS VEL: 42.7 CM/SEC
BH CV ECHO MEAS - PULM S/D: 1.2
BH CV ECHO MEAS - PULM SYS VEL: 52.4 CM/SEC
BH CV ECHO MEAS - PVA(V,A): 2.1 CM^2
BH CV ECHO MEAS - PVA(V,D): 2.1 CM^2
BH CV ECHO MEAS - QP/QS: 0.87
BH CV ECHO MEAS - RAP SYSTOLE: 3 MMHG
BH CV ECHO MEAS - RV MAX PG: 1.7 MMHG
BH CV ECHO MEAS - RV MEAN PG: 0.94 MMHG
BH CV ECHO MEAS - RV V1 MAX: 65.5 CM/SEC
BH CV ECHO MEAS - RV V1 MEAN: 45.6 CM/SEC
BH CV ECHO MEAS - RV V1 VTI: 16 CM
BH CV ECHO MEAS - RVOT AREA: 2.5 CM^2
BH CV ECHO MEAS - RVOT DIAM: 1.8 CM
BH CV ECHO MEAS - RVSP: 26 MMHG
BH CV ECHO MEAS - SI(AO): 71.3 ML/M^2
BH CV ECHO MEAS - SI(CUBED): 35.3 ML/M^2
BH CV ECHO MEAS - SI(LVOT): 24.9 ML/M^2
BH CV ECHO MEAS - SI(MOD-SP2): 22.5 ML/M^2
BH CV ECHO MEAS - SI(MOD-SP4): 27.4 ML/M^2
BH CV ECHO MEAS - SI(TEICH): 31.4 ML/M^2
BH CV ECHO MEAS - SUP REN AO DIAM: 1.8 CM
BH CV ECHO MEAS - SV(AO): 132.8 ML
BH CV ECHO MEAS - SV(CUBED): 65.8 ML
BH CV ECHO MEAS - SV(LVOT): 46.4 ML
BH CV ECHO MEAS - SV(MOD-SP2): 42 ML
BH CV ECHO MEAS - SV(MOD-SP4): 51 ML
BH CV ECHO MEAS - SV(RVOT): 40.1 ML
BH CV ECHO MEAS - SV(TEICH): 58.4 ML
BH CV ECHO MEAS - TAPSE (>1.6): 2.4 CM2
BH CV ECHO MEAS - TR MAX VEL: 241.6 CM/SEC
BH CV ECHO MEASUREMENTS AVERAGE E/E' RATIO: 10.75
BH CV XLRA - RV BASE: 2.9 CM
BH CV XLRA - TDI S': 11 CM/SEC
LEFT ATRIUM VOLUME INDEX: 20 ML/M2
LV EF 2D ECHO EST: 69 %
MAXIMAL PREDICTED HEART RATE: 161 BPM
SINUS: 3 CM
STJ: 2.9 CM
STRESS TARGET HR: 137 BPM

## 2018-09-12 PROCEDURE — 93306 TTE W/DOPPLER COMPLETE: CPT

## 2018-09-12 PROCEDURE — 93306 TTE W/DOPPLER COMPLETE: CPT | Performed by: INTERNAL MEDICINE

## 2018-09-13 PROCEDURE — 0298T HOLTER MONITOR - 72 HOUR UP TO 21 DAY: CPT | Performed by: INTERNAL MEDICINE

## 2018-09-17 ENCOUNTER — OUTSIDE FACILITY SERVICE (OUTPATIENT)
Dept: GASTROENTEROLOGY | Facility: CLINIC | Age: 58
End: 2018-09-17

## 2018-09-17 PROCEDURE — 45378 DIAGNOSTIC COLONOSCOPY: CPT | Performed by: INTERNAL MEDICINE

## 2018-09-18 ENCOUNTER — TELEPHONE (OUTPATIENT)
Dept: CARDIOLOGY | Facility: CLINIC | Age: 58
End: 2018-09-18

## 2018-09-18 NOTE — TELEPHONE ENCOUNTER
I called  office. They do not have the report from yesterday from her colonoscopy. I will call back later today to obtain the records.  #: [phone].    Thanks Cherelle

## 2018-09-18 NOTE — TELEPHONE ENCOUNTER
Pt called to make sure we had Holter monitor  results for appt on 09/21/18.    She also wanted to let you know she had a colonoscopy yesterday and while under anesthesia she had an episode of a-fib

## 2018-09-19 ENCOUNTER — TELEPHONE (OUTPATIENT)
Dept: GASTROENTEROLOGY | Facility: CLINIC | Age: 58
End: 2018-09-19

## 2018-09-19 NOTE — TELEPHONE ENCOUNTER
I spoke to León and the note per  is not completed. I will call back on Friday to see if the note is completed.

## 2018-09-19 NOTE — TELEPHONE ENCOUNTER
----- Message from León Guillen sent at 9/19/2018  1:34 PM EDT -----  Regarding: [facility] CALLED   Contact: [phone]  DEBBIE CALLING FROM [facility] ASKING IF PT SCOPE RESULTS CAN GET FAXED OVER TO THEM ONCE  SIGNS THEM OFF TO DR. KIRBY. PT HAD SCOPE DONE 2 DAYS AGO        FAX#[phone]

## 2018-09-21 ENCOUNTER — OFFICE VISIT (OUTPATIENT)
Dept: CARDIOLOGY | Facility: CLINIC | Age: 58
End: 2018-09-21

## 2018-09-21 VITALS
BODY MASS INDEX: 21.99 KG/M2 | DIASTOLIC BLOOD PRESSURE: 68 MMHG | SYSTOLIC BLOOD PRESSURE: 110 MMHG | HEIGHT: 70 IN | HEART RATE: 61 BPM | WEIGHT: 153.6 LBS

## 2018-09-21 DIAGNOSIS — I48.0 PAROXYSMAL ATRIAL FIBRILLATION (HCC): Primary | ICD-10-CM

## 2018-09-21 PROCEDURE — 93000 ELECTROCARDIOGRAM COMPLETE: CPT | Performed by: INTERNAL MEDICINE

## 2018-09-21 PROCEDURE — 99214 OFFICE O/P EST MOD 30 MIN: CPT | Performed by: INTERNAL MEDICINE

## 2018-09-21 RX ORDER — METOPROLOL SUCCINATE 25 MG/1
12.5 TABLET, EXTENDED RELEASE ORAL DAILY
Qty: 45 TABLET | Refills: 3 | Status: SHIPPED | OUTPATIENT
Start: 2018-09-21 | End: 2019-09-15 | Stop reason: SDUPTHER

## 2018-09-21 NOTE — PROGRESS NOTES
Subjective:     Encounter Date:09/04/2018      Patient ID: Xochilt Whittington is a 58 y.o. female.    Chief Complaint: PAF  History of Present Illness    Dear Dr. Llanes,    I had the pleasure of seeing this patient in the office today for f/u of her PAF.    She just had a colonoscopy at [facility]. They told her she had an episode of afib during the procedure. We have called several times and we have not had any luck as yet getting her records.    She has been having some episodes of tachycardia and palpitations that had those when she had her monitor on his well and she never had any atrial fibrillation.  She says that it is stressful because can't tell when she has the atrial fibrillation.  She just can't tell for sure.    She had a monitor (14 days) that showed no AFib. No other issues.  Echo 9/2018:  Interpretation Summary     · Left ventricular systolic function is normal. Calculated EF = 69%. Estimated EF was in agreement with the calculated EF. Estimated EF = 69%. Normal left ventricular cavity size and wall thickness noted. All left ventricular wall segments contract normally. Left ventricular diastolic function is normal.  · Trace aortic valve regurgitation is present.  · Mild mitral valve regurgitation is present.  · Mild tricuspid valve regurgitation is present. Estimated right ventricular systolic pressure from tricuspid regurgitation is normal (<35 mmHg). Calculated right ventricular systolic pressure from tricuspid regurgitation is 26 mmHg.        She is a pleasant female who was recently in the emergency room on August 18 with an episode of paroxysmal atrial fibrillation.  She was playing pickle ball and suddenly developed tachycardia.  She felt very weak and very drained.  She felt like her heart was in a jump out of her chest.  She says she has never felt anything like this before.  She came the emergency room and was found to be in atrial fibrillation.  She was also found to be  "significantly dehydrated by her lab work.  She was given IV fluid and spontaneously converted to sinus rhythm.  She does admit that she sometimes goes and doesn't drink much water and she knows that she was really dehydrated that day.  They discharged her with a monitor which she is still wearing.  She has not felt any other episodes of tachycardia or arrhythmia.    She came back to the emergency room several days later because of fatigue and they cut her metoprolol in half.    This patient has no known cardiac history.  This patient has no history of coronary artery disease, congestive heart failure, rheumatic fever, rheumatic heart disease, congenital heart disease or heart murmur.  This patient has never required invasive cardiovascular evaluation.    The following portions of the patient's history were reviewed and updated as appropriate: allergies, current medications, past family history, past medical history, past social history, past surgical history and problem list.    Past Medical History:   Diagnosis Date   • Anxiety and depression     was on antidepresssants, ended about a year ago, 2016.   • Atypical squamous cells of undetermined significance (ASCUS) on Papanicolaou smear of cervix 1998   • Encounter for IUD removal 05/13/2014    removed due to spotting with mirena  FSH = 50.6   • Eye injury     right eye (as a child)   • Family history of colon cancer     Father had colon ca in his early 50's.   • History of Papanicolaou smear of cervix 2016 2016, Pap w/ ASCUS, +HPV.  2015, Positive HPV endometrial cells present.  2014, Negative pap, negative HR-HPV.    • Insomnia    • PAF (paroxysmal atrial fibrillation) (CMS/HCC)    • PCB (post coital bleeding)    • PMB (postmenopausal bleeding)    • Pyelonephritis 07/13/2004    Right    • Vaginal delivery     1988  \"JOAN\"    1992  \"PAUL\"       Past Surgical History:   Procedure Laterality Date   • BREAST BIOPSY Right 1989    20 yr ago, benign clogged " duct.   • COLONOSCOPY W/ BIOPSIES  2013    x3  hx of polyps and family history   due in 2018   • DENTAL PROCEDURE     • HIP SURGERY      @ age 14: Fx hip: [;aced hip pins, then removed.   • TONSILLECTOMY         Social History     Social History   • Marital status: Single     Spouse name: N/A   • Number of children: 2   • Years of education: N/A     Occupational History   • Not on file.     Social History Main Topics   • Smoking status: Never Smoker   • Smokeless tobacco: Never Used      Comment: caff use   • Alcohol use No   • Drug use: No   • Sexual activity: Not on file     Other Topics Concern   • Not on file     Social History Narrative   • No narrative on file       Review of Systems   Constitution: Negative for chills, decreased appetite, fever and night sweats.   HENT: Negative for ear discharge, ear pain, hearing loss, nosebleeds and sore throat.    Eyes: Negative for blurred vision, double vision and pain.   Cardiovascular: Negative for cyanosis.   Respiratory: Negative for hemoptysis and sputum production.    Endocrine: Negative for cold intolerance and heat intolerance.   Hematologic/Lymphatic: Negative for adenopathy.   Skin: Negative for dry skin, itching, nail changes, rash and suspicious lesions.   Musculoskeletal: Negative for arthritis, gout, muscle cramps, muscle weakness, myalgias and neck pain.   Gastrointestinal: Negative for anorexia, bowel incontinence, constipation, diarrhea, dysphagia, hematemesis and jaundice.   Genitourinary: Negative for bladder incontinence, dysuria, flank pain, frequency, hematuria and nocturia.   Neurological: Negative for focal weakness, numbness, paresthesias and seizures.   Psychiatric/Behavioral: Negative for altered mental status, hallucinations, hypervigilance, suicidal ideas and thoughts of violence.   Allergic/Immunologic: Negative for persistent infections.         ECG 12 Lead  Date/Time: 9/21/2018 4:07 PM  Performed by: GINA MAYA III  Authorized by:  "MAYA III, GINA MCKEON.   Comparison: compared with previous ECG   Similar to previous ECG  Rhythm: sinus rhythm  Rate: normal  Conduction: conduction normal  ST Segments: ST segments normal  T Waves: T waves normal  QRS axis: normal  Other: no other findings  Clinical impression: normal ECG               Objective:     Vitals:    09/21/18 1449   BP: 110/68   BP Location: Left arm   Pulse: 61   Weight: 69.7 kg (153 lb 9.6 oz)   Height: 177.8 cm (70\")         Physical Exam   Constitutional: She is oriented to person, place, and time. She appears well-developed and well-nourished. No distress.   HENT:   Head: Normocephalic and atraumatic.   Nose: Nose normal.   Mouth/Throat: Oropharynx is clear and moist.   Eyes: Pupils are equal, round, and reactive to light. Conjunctivae and EOM are normal. Right eye exhibits no discharge. Left eye exhibits no discharge.   Neck: Normal range of motion. Neck supple. No tracheal deviation present. No thyromegaly present.   Cardiovascular: Normal rate, regular rhythm, S1 normal, S2 normal, normal heart sounds and normal pulses.  Exam reveals no S3.    Pulmonary/Chest: Effort normal and breath sounds normal. No stridor. No respiratory distress. She exhibits no tenderness.   Abdominal: Soft. Bowel sounds are normal. She exhibits no distension and no mass. There is no tenderness. There is no rebound and no guarding.   Musculoskeletal: Normal range of motion. She exhibits no tenderness or deformity.   Lymphadenopathy:     She has no cervical adenopathy.   Neurological: She is alert and oriented to person, place, and time. She has normal reflexes.   Skin: Skin is warm and dry. No rash noted. She is not diaphoretic. No erythema.   Psychiatric: She has a normal mood and affect. Thought content normal.       Lab Review:             Performed        Assessment:          Diagnosis Plan   1. Paroxysmal atrial fibrillation (CMS/HCC)            Plan:       Atrial Fibrillation and Atrial " Flutter  Assessment  • The patient has paroxysmal atrial fibrillation  • This is non-valvular in etiology  • The patient's CHADS2-VASc score is 0  • A [id number] score of 0 is considered a low risk for a thromboembolic event    Plan  • Continue beta blocker for rhythm control    2. We will continue her current medical therapy, except I'm and switch her metoprolol over to metoprolol succinate 25 mg one half tablet daily.  I also suggested that she may want to investigate a [employer] monitor for her iPhone which would allow her to always check if she thinks she could be in AFib.    Thank you very much for allowing us to participate in the care of this pleasant patient.  Please don't hesitate to call if I can be of assistance in any way.      Current Outpatient Prescriptions:   •  estradiol (VIVELLE-DOT) 0.025 MG/24HR patch, Place 1 patch on the skin 2 (Two) Times a Week., Disp: 8 patch, Rfl: 11  •  fluconazole (DIFLUCAN) 150 MG tablet, Take 150 mg by mouth 1 (One) Time Per Week., Disp: , Rfl:   •  metoprolol tartrate (LOPRESSOR) 25 MG tablet, Take 0.5 tablets by mouth every night at bedtime., Disp: 20 tablet, Rfl: 0  •  progesterone (PROMETRIUM) 100 MG capsule, Take 1 capsule by mouth every night at bedtime., Disp: 90 capsule, Rfl: 3         EMR Dragon/Transcription disclaimer:    Much of this encounter note is an electronic transcription/translation of spoken language to printed text. The electronic translation of spoken language may permit erroneous, or at times, nonsensical words or phrases to be inadvertently transcribed; Although I have reviewed the note for such errors, some may still exist.

## 2018-09-21 NOTE — TELEPHONE ENCOUNTER
I spoke to León and the note per  is not completed. I will call back next week to see if the note has been  completed.

## 2018-09-26 ENCOUNTER — TELEPHONE (OUTPATIENT)
Dept: CARDIOLOGY | Facility: CLINIC | Age: 58
End: 2018-09-26

## 2018-09-26 NOTE — TELEPHONE ENCOUNTER
Scope reports faxed to [facility] at [phone] and fax confirmation received.  No path sent due to no specimens taken.  Confirmation scanned under media tab.

## 2018-09-26 NOTE — TELEPHONE ENCOUNTER
The note from  has been placed in your in box to review. I did not see where she had A-Fib during the colonoscopy.    Thanks Cherelle

## 2018-10-10 ENCOUNTER — TELEPHONE (OUTPATIENT)
Dept: OBSTETRICS AND GYNECOLOGY | Age: 58
End: 2018-10-10

## 2018-10-10 DIAGNOSIS — Z78.0 MENOPAUSE: ICD-10-CM

## 2018-10-10 RX ORDER — ESTRADIOL 0.03 MG/D
1 FILM, EXTENDED RELEASE TRANSDERMAL 2 TIMES WEEKLY
Qty: 24 PATCH | Refills: 0 | Status: SHIPPED | OUTPATIENT
Start: 2018-10-11 | End: 2018-11-01 | Stop reason: SDUPTHER

## 2018-10-10 NOTE — TELEPHONE ENCOUNTER
WANTS ESTRADIOL 0.25 PATCH SENT TO [facility]. 3 MO SUPPLY, BUT PT STATES IT NEEDS PA. THANKS

## 2018-10-22 ENCOUNTER — APPOINTMENT (OUTPATIENT)
Dept: WOMENS IMAGING | Facility: HOSPITAL | Age: 58
End: 2018-10-22

## 2018-10-22 PROCEDURE — 77063 BREAST TOMOSYNTHESIS BI: CPT | Performed by: RADIOLOGY

## 2018-10-22 PROCEDURE — 77067 SCR MAMMO BI INCL CAD: CPT | Performed by: RADIOLOGY

## 2018-11-01 ENCOUNTER — OFFICE VISIT (OUTPATIENT)
Dept: OBSTETRICS AND GYNECOLOGY | Age: 58
End: 2018-11-01

## 2018-11-01 ENCOUNTER — PROCEDURE VISIT (OUTPATIENT)
Dept: OBSTETRICS AND GYNECOLOGY | Age: 58
End: 2018-11-01

## 2018-11-01 VITALS
HEIGHT: 70 IN | BODY MASS INDEX: 21.19 KG/M2 | WEIGHT: 148 LBS | DIASTOLIC BLOOD PRESSURE: 70 MMHG | SYSTOLIC BLOOD PRESSURE: 110 MMHG

## 2018-11-01 DIAGNOSIS — R87.610 ATYPICAL SQUAMOUS CELLS OF UNDETERMINED SIGNIFICANCE (ASCUS) ON PAPANICOLAOU SMEAR OF CERVIX: ICD-10-CM

## 2018-11-01 DIAGNOSIS — Z11.51 SCREENING FOR HUMAN PAPILLOMAVIRUS: ICD-10-CM

## 2018-11-01 DIAGNOSIS — Z78.0 POST-MENOPAUSAL: Primary | ICD-10-CM

## 2018-11-01 DIAGNOSIS — Z01.419 WELL WOMAN EXAM WITH ROUTINE GYNECOLOGICAL EXAM: Primary | ICD-10-CM

## 2018-11-01 DIAGNOSIS — Z79.890 POST-MENOPAUSE ON HRT (HORMONE REPLACEMENT THERAPY): ICD-10-CM

## 2018-11-01 DIAGNOSIS — Z78.0 MENOPAUSE: ICD-10-CM

## 2018-11-01 PROBLEM — M85.89 OSTEOPENIA OF MULTIPLE SITES: Status: ACTIVE | Noted: 2018-11-01

## 2018-11-01 PROCEDURE — 99396 PREV VISIT EST AGE 40-64: CPT | Performed by: OBSTETRICS & GYNECOLOGY

## 2018-11-01 PROCEDURE — 77080 DXA BONE DENSITY AXIAL: CPT | Performed by: OBSTETRICS & GYNECOLOGY

## 2018-11-01 RX ORDER — ESTRADIOL 0.03 MG/D
1 FILM, EXTENDED RELEASE TRANSDERMAL 2 TIMES WEEKLY
Qty: 24 PATCH | Refills: 4 | Status: SHIPPED | OUTPATIENT
Start: 2018-11-01 | End: 2019-10-28

## 2018-11-01 NOTE — PROGRESS NOTES
Routine Annual Visit    2018    Patient: Xochilt Whittington          MR#:[id number]    Chief Complaint   Patient presents with   • Gynecologic Exam     Last pap  negative, HPV -        58 y.o. female  who presents for annual exam.     HISTORY OF PRESENT ILLNESS:    GYN Complaints: None.    Other Complaints: Rash in bikini line. Only irritated if using Reeder there.    GYN HISTORY:    1.  Menstrual Hx: MP .                 HRT= Estradiol Patch, 0.025 mg twice weekly, Progesterone 100 mg QHS.         Current contraception: post menopausal status    2.  History of abnormal Pap smear: yes - 2016, ASCUS, positive HR-HPV, neg16 and 18..         Pap smear Hx:  8332-2009, Normal yearly Paps.   , ASCUS.   , '99X2, 6317-4474, Normal yearly Paps.   , Neg Pap, Neg HR-HPV.    , Neg Pap, Pos HPV, endometrial cells present. ([place])   , ASCUS, Pos HR-HPV, Neg Genotypes 16 and 18.  Colpo bx's= negative ECC, negative biopsies, normal endometrium. ([place])    Neg Pap, Neg HR-HPV.    NEW PAST MEDICAL HISTORY:    3.  New Medical Hx:  Atrial Fibrillation, 2018 !! No previous hx. Discussed HRT. Rec stopping if goes on anti-coagulants. Suggested Baby ASA daily.     4.  New Surgical Hx:  None.     5.  New Family Medical Hx:  None.      6.  SCREENINGS:  =Family history of breast cancer: no  =Family history of ovarian cancer: no  =Family history of uterine cancer: no  =Family History of colon cancer: yes - Father, early 50's and PGF and PGr Uncle.    Perform regular self breast exam: no  Breast self-examination technique  reviewed and the patient encouraged to perform self breast exams monthly.     Mammogram: up to date. Oct 22, 2018, Fatty replaced, Cat 1.  Colonoscopy: up to date. 2018: No polyps, repeat in 5 yr.  DEXA: done today.= Mod osteopenia in L1 and Left hip.    7.  LIFESTYLE:  =Diet: Healthy.     =Exercise:  yes - Running and pickle ball..    =Calcium:  yes - 1200  "mg/day..  =Vitamin D:  yes - 800 IU/day.   = We discussed calcium intake needs to help prevent osteoporosis:          Review of Systems    Objective     /70   Ht 177.8 cm (70\")   Wt 67.1 kg (148 lb)   BMI 21.24 kg/m²     PHYSICAL EXAM    OBGyn Exam    Constitutional: Well-developed, well-nourished, thin  female, in no distress, alert and well oriented ×3.    Skin is warm, dry, without rash.       Neck: Normal, trachea in the midline.  Thyroid exam normal.  No carotid bruits bilaterally.         No cervical lymphadenopathy.    Back: Normal.     Lungs: Clear to auscultation.  Chest wall appears normal.    Heart:: Regular Rhythm without murmur or gallop.  Not a hint of AF.    Breasts: Regular self breast exams encouraged.  Breasts feel like they have scattered fibroglandular density.        Right breast nontender, without dominant mass.  No nipple discharge.            No superficial skin changes.  No axillary adenopathy.        Left breast nontender, without dominant mass.  No nipple discharge.            No superficial skin changes.  No axillary adenopathy.      Abdomen: Flat, soft and nontender.No palpable mass.           No hepatosplenomegaly.  Flanks are negative.          Bowel sounds normal.  No abdominal bruit.          No inguinal lymphadenopathy bilaterally.     Pelvic exam :  Vulva normal. Minimal erythema in inguinal folds. Rec 1% Hydrocortisone cream as needed.          External genitalia normal.  BUS negative.             Introitus normal.  Vaginal mucosa normal.  Looks well estrogenized.  No appreciable cystocele.           Cervix normal, Pap with HPV.  No cervical motion tenderness.          Uterus retroverted, normal size, normal shape, and position. Not tender.          Adnexa are negative bilaterally.  No tenderness.  No palpable mass.          Rectovaginal exam negative .      Musc /Skel: Lower extremities without edema.     Neuro: Coordination is grossly normal.  Gait is " normal.    Psych: Mood and affect is normal.  Behavior normal.  Thought content normal.            Judgment normal.       =All questions were answered.      Assessment/Plan   Xochilt was seen today for gynecologic exam.    Diagnoses and all orders for this visit:    Well woman exam with routine gynecological exam  -     PapIG, HPV, Rfx 16 / 18    Menopause  -     estradiol (VIVELLE-DOT) 0.025 MG/24HR patch; Place 1 patch on the skin as directed by provider 2 (Two) Times a Week.  -     progesterone (PROMETRIUM) 100 MG capsule; Take 1 capsule by mouth every night at bedtime.    Post-menopause on HRT (hormone replacement therapy)  -     estradiol (VIVELLE-DOT) 0.025 MG/24HR patch; Place 1 patch on the skin as directed by provider 2 (Two) Times a Week.  -     progesterone (PROMETRIUM) 100 MG capsule; Take 1 capsule by mouth every night at bedtime.    Atypical squamous cells of undetermined significance (ASCUS) on Papanicolaou smear of cervix  Comments:  Repeat Pap with HPV.    Screening for human papillomavirus  -     PapIG, HPV, Rfx 16 / 18      COMMENTS: Normal Gyn Exam. Repeat Pap today with HPV. Hx of Atrial Fib secondary to dehydration. Resolved with hydration. On metoprolol. Will continue HRT, rec baby ASA daily.    Good Grewal MD  11/2/2018

## 2018-11-02 ENCOUNTER — PATIENT MESSAGE (OUTPATIENT)
Dept: CARDIOLOGY | Facility: CLINIC | Age: 58
End: 2018-11-02

## 2018-11-02 NOTE — TELEPHONE ENCOUNTER
From: Xochilt Whittington  To: Saeed Barba III, MD  Sent: 11/2/2018 8:48 AM EDT  Subject: Non-Urgent Medical Question     I just saw my GYN doctor, Dr. Good Grewal. I told him about my a fib, and he recommended taking a baby aspirin each day, since I am using an estrogen patch & also taking progesterone., which I think I understood, could thicken blood. As my cardiologist, what is your opinion on this? Should I go ahead and take a baby aspirin each day? Thx!

## 2018-11-07 LAB
CYTOLOGIST CVX/VAG CYTO: ABNORMAL
CYTOLOGY CVX/VAG DOC THIN PREP: ABNORMAL
DX ICD CODE: ABNORMAL
DX ICD CODE: ABNORMAL
HIV 1 & 2 AB SER-IMP: ABNORMAL
HPV I/H RISK 1 DNA CVX QL PROBE+SIG AMP: NEGATIVE
OTHER STN SPEC: ABNORMAL
PATH REPORT.FINAL DX SPEC: ABNORMAL
PATHOLOGIST CVX/VAG CYTO: ABNORMAL
RECOM F/U CVX/VAG CYTO: ABNORMAL
STAT OF ADQ CVX/VAG CYTO-IMP: ABNORMAL

## 2018-11-09 NOTE — PROGRESS NOTES
Notify Xochilt that her Pap smear had some atypical cells but was negative for high-risk HPV.  I would just recommend that we repeat the Pap smear in 6 months.  If she has persistent atypia then we will pursue a colposcopy.  I don't think we need to do that now.

## 2019-03-29 ENCOUNTER — OFFICE VISIT (OUTPATIENT)
Dept: CARDIOLOGY | Facility: CLINIC | Age: 59
End: 2019-03-29

## 2019-03-29 VITALS
HEIGHT: 70 IN | SYSTOLIC BLOOD PRESSURE: 120 MMHG | WEIGHT: 151 LBS | DIASTOLIC BLOOD PRESSURE: 72 MMHG | HEART RATE: 56 BPM | BODY MASS INDEX: 21.62 KG/M2

## 2019-03-29 DIAGNOSIS — I48.0 PAROXYSMAL ATRIAL FIBRILLATION (HCC): Primary | ICD-10-CM

## 2019-03-29 PROCEDURE — 93000 ELECTROCARDIOGRAM COMPLETE: CPT | Performed by: INTERNAL MEDICINE

## 2019-03-29 PROCEDURE — 99214 OFFICE O/P EST MOD 30 MIN: CPT | Performed by: INTERNAL MEDICINE

## 2019-03-29 NOTE — PROGRESS NOTES
Subjective:     Encounter Date:03/29/2019      Patient ID: Xochilt Whittington is a 58 y.o. female.    Chief Complaint: PAF  History of Present Illness    Dear Dr. Llanes,    I had the pleasure of seeing this patient in the office today for f/u of her PAF.    Is been doing well over the last 6 months with no recurrent episodes of atrial fibrillation.  She has been on metoprolol 12.5 mg daily and has been doing well without any complaints.  She has not any chest pain or chest discomfort.    She saw her in 2018 after she had a colonoscopy at [facility]. They told her she had an episode of afib during the procedure. We  called several times and we have not had any luck as yet getting her records.    She had a monitor (14 days) that showed no AFib. No other issues.  Echo 9/2018:  Interpretation Summary     · Left ventricular systolic function is normal. Calculated EF = 69%. Estimated EF was in agreement with the calculated EF. Estimated EF = 69%. Normal left ventricular cavity size and wall thickness noted. All left ventricular wall segments contract normally. Left ventricular diastolic function is normal.  · Trace aortic valve regurgitation is present.  · Mild mitral valve regurgitation is present.  · Mild tricuspid valve regurgitation is present. Estimated right ventricular systolic pressure from tricuspid regurgitation is normal (<35 mmHg). Calculated right ventricular systolic pressure from tricuspid regurgitation is 26 mmHg.        She is a pleasant female who was recently in the emergency room on August 18 with an episode of paroxysmal atrial fibrillation.  She was playing pickle ball and suddenly developed tachycardia.  She felt very weak and very drained.  She felt like her heart was in a jump out of her chest.  She says she has never felt anything like this before.  She came the emergency room and was found to be in atrial fibrillation.  She was also found to be significantly dehydrated by her  lab work.  She was given IV fluid and spontaneously converted to sinus rhythm.  She does admit that she sometimes goes and doesn't drink much water and she knows that she was really dehydrated that day.  They discharged her with a monitor which she is still wearing.  She has not felt any other episodes of tachycardia or arrhythmia.    She came back to the emergency room several days later because of fatigue and they cut her metoprolol in half.    This patient has no known cardiac history.  This patient has no history of coronary artery disease, congestive heart failure, rheumatic fever, rheumatic heart disease, congenital heart disease or heart murmur.  This patient has never required invasive cardiovascular evaluation.    The following portions of the patient's history were reviewed and updated as appropriate: allergies, current medications, past family history, past medical history, past social history, past surgical history and problem list.    Past Medical History:   Diagnosis Date   • Anxiety and depression 2016    Was on antidepresssants, ended about a year ago, 2016.   • Atypical squamous cells of undetermined significance (ASCUS) on Papanicolaou smear of cervix 1998, 2016 1998, ASCUS.  Subsequent Paps normal.  2016, ASCUS, positive HR-HPV, [id number] and 18.  Colpo biopsies negative.([place]).   • Colon polyps    • Diverticulosis of colon 2013    Asymptomatic.   • Encounter for IUD removal 05/13/2014    Removed due to spotting with Mirena.  FSH = 50.6   • Eye injury     right eye (as a child)   • Family history of colon cancer     Father had colon ca in his early 50's.   • History of Papanicolaou smear of cervix 2017 2014, Neg Pap, Neg HR-HPV.  2015, Neg Pap, Pos HPV, endometrial cells present.  2016, ASCUS, Pos HR-HPV, Neg Genotypes 16 and 18.  Colpo bx's= negative ECC, negative biopsies, normal endometrium.  2017 Neg Pap, Neg HR-HPV.   • History of Papanicolaou smear of cervix [id number]    Pap smear  "Hx:6239-7066, Normal yearly Paps. 1998, ASCUS. 1998, '99X2, 9569-5036, Normal yearly Paps.   • Hx of bone density study 2018    2018: DEXA scan= moderate osteopenia in L1 and right femoral neck.  T scores: L1 -1.8; L2 -0.8; L3 -0.1; L4 by 0.4.  Right femoral neck -1.8; total right hip -1.9   2008 DEXA scan: Mild lumbar osteopenia and moderate to severe osteopenia and right femoral neck.  T scores: L1 -1.5; L2 -1.3; L3 -0.4; L4 -1.3.  Right femoral neck -2.0.  Total right femur -1.9.   • Insomnia     More of a \"bad sleeper\". Awakens early AM.   • PAF (paroxysmal atrial fibrillation) (CMS/[facility]) 08/25/2018    Sudden onset this year, uncertain etiology, except she was playin pickle ball, and was very dehydrated as evidenced by elevated BUN, creatinine, and hemoglobin.  She converted with IV fluids.  Placed on Low dose Toprolol, no anticoagulants.  Had follow-up evaluation by Dr. Saeed Barba.   • PCB (post coital bleeding) 2014    Intermittantly with IUD, Resolved after removal of IUD.   • PMB (postmenopausal bleeding) 2015    Endometrial biopsies= Normal. While in [place]. None since.   • Vaginal delivery     1988  \"JOAN\"    1992  \"PAUL\"       Past Surgical History:   Procedure Laterality Date   • BREAST BIOPSY Right 1989    20 yr ago, benign clogged duct.   • COLONOSCOPY W/ BIOPSIES  2013    x3: Started at age 38 with personal hx of polyps and family history. Last 'scope, Sept 17, 2018= No polyps.   • DENTAL PROCEDURE     • HIP SURGERY  1974    @ age 14: Fx hip: Placed hip pins, then removed three years later.   • TONSILLECTOMY  1964    Removed at 4 yoa.       Social History     Socioeconomic History   • Marital status: Single     Spouse name: Not on file   • Number of children: 2   • Years of education: Not on file   • Highest education level: Not on file   Tobacco Use   • Smoking status: Never Smoker   • Smokeless tobacco: Never Used   • Tobacco comment: caff use   Substance and Sexual Activity   • Alcohol use: " "No     Comment: Rarely, twice a year.   • Drug use: No   • Sexual activity: No     Partners: Male     Birth control/protection: Post-menopausal       Review of Systems   Constitution: Negative for chills, decreased appetite, fever and night sweats.   HENT: Negative for ear discharge, ear pain, hearing loss, nosebleeds and sore throat.    Eyes: Negative for blurred vision, double vision and pain.   Cardiovascular: Negative for cyanosis.   Respiratory: Negative for hemoptysis and sputum production.    Endocrine: Negative for cold intolerance and heat intolerance.   Hematologic/Lymphatic: Negative for adenopathy.   Skin: Negative for dry skin, itching, nail changes, rash and suspicious lesions.   Musculoskeletal: Negative for arthritis, gout, muscle cramps, muscle weakness, myalgias and neck pain.   Gastrointestinal: Negative for anorexia, bowel incontinence, constipation, diarrhea, dysphagia, hematemesis and jaundice.   Genitourinary: Negative for bladder incontinence, dysuria, flank pain, frequency, hematuria and nocturia.   Neurological: Negative for focal weakness, numbness, paresthesias and seizures.   Psychiatric/Behavioral: Negative for altered mental status, hallucinations, hypervigilance, suicidal ideas and thoughts of violence.   Allergic/Immunologic: Negative for persistent infections.         ECG 12 Lead  Date/Time: 3/29/2019 12:35 PM  Performed by: Saeed Barba III, MD  Authorized by: Saeed Barba III, MD   Comparison: compared with previous ECG   Similar to previous ECG  Rhythm: sinus rhythm  Rate: normal  Conduction: conduction normal  ST Segments: ST segments normal  T Waves: T waves normal  QRS axis: normal  Other: no other findings    Clinical impression: normal ECG               Objective:     Vitals:    03/29/19 1125   BP: 120/72   Pulse: 56   Weight: 68.5 kg (151 lb)   Height: 177.8 cm (70\")         Physical Exam   Constitutional: She is oriented to person, place, and time. She appears " well-developed and well-nourished. No distress.   HENT:   Head: Normocephalic and atraumatic.   Nose: Nose normal.   Mouth/Throat: Oropharynx is clear and moist.   Eyes: Conjunctivae and EOM are normal. Pupils are equal, round, and reactive to light. Right eye exhibits no discharge. Left eye exhibits no discharge.   Neck: Normal range of motion. Neck supple. No tracheal deviation present. No thyromegaly present.   Cardiovascular: Normal rate, regular rhythm, S1 normal, S2 normal, normal heart sounds and normal pulses. Exam reveals no S3.   Pulmonary/Chest: Effort normal and breath sounds normal. No stridor. No respiratory distress. She exhibits no tenderness.   Abdominal: Soft. Bowel sounds are normal. She exhibits no distension and no mass. There is no tenderness. There is no rebound and no guarding.   Musculoskeletal: Normal range of motion. She exhibits no tenderness or deformity.   Lymphadenopathy:     She has no cervical adenopathy.   Neurological: She is alert and oriented to person, place, and time. She has normal reflexes.   Skin: Skin is warm and dry. No rash noted. She is not diaphoretic. No erythema.   Psychiatric: She has a normal mood and affect. Thought content normal.       Lab Review:             Performed        Assessment:          Diagnosis Plan   1. Paroxysmal atrial fibrillation (CMS/HCC)  ECG 12 Lead          Plan:       Atrial Fibrillation and Atrial Flutter  Assessment  • The patient has paroxysmal atrial fibrillation  • This is non-valvular in etiology  • The patient's CHADS2-VASc score is 0  • A [id number] score of 0 is considered a low risk for a thromboembolic event    Plan  • Continue beta blocker for rhythm control    2.  We discussed cessation of her metoprolol.  She is going to think about this.  I explained to her that she could stop it if she wanted to, although there would not be any problem with her continuing the metoprolol at this point.  She will call and let me  know.    Thank you very much for allowing us to participate in the care of this pleasant patient.  Please don't hesitate to call if I can be of assistance in any way.      Current Outpatient Medications:   •  estradiol (VIVELLE-DOT) 0.025 MG/24HR patch, Place 1 patch on the skin as directed by provider 2 (Two) Times a Week., Disp: 24 patch, Rfl: 4  •  fluconazole (DIFLUCAN) 150 MG tablet, Take 300 mg by mouth 1 (One) Time Per Week., Disp: , Rfl:   •  metoprolol succinate XL (TOPROL-XL) 25 MG 24 hr tablet, Take 0.5 tablets by mouth Daily., Disp: 45 tablet, Rfl: 3  •  progesterone (PROMETRIUM) 100 MG capsule, Take 1 capsule by mouth every night at bedtime., Disp: 90 capsule, Rfl: 4         EMR Dragon/Transcription disclaimer:    Much of this encounter note is an electronic transcription/translation of spoken language to printed text. The electronic translation of spoken language may permit erroneous, or at times, nonsensical words or phrases to be inadvertently transcribed; Although I have reviewed the note for such errors, some may still exist.

## 2019-05-09 ENCOUNTER — OFFICE VISIT (OUTPATIENT)
Dept: OBSTETRICS AND GYNECOLOGY | Age: 59
End: 2019-05-09

## 2019-05-09 VITALS
SYSTOLIC BLOOD PRESSURE: 108 MMHG | BODY MASS INDEX: 21.33 KG/M2 | WEIGHT: 149 LBS | HEIGHT: 70 IN | DIASTOLIC BLOOD PRESSURE: 74 MMHG

## 2019-05-09 DIAGNOSIS — Z12.4 ENCOUNTER FOR REPEAT PAPANICOLAOU SMEAR OF CERVIX: Primary | ICD-10-CM

## 2019-05-09 DIAGNOSIS — R87.610 ASCUS OF CERVIX WITH NEGATIVE HIGH RISK HPV: ICD-10-CM

## 2019-05-09 DIAGNOSIS — Z11.51 SCREENING FOR HPV (HUMAN PAPILLOMAVIRUS): ICD-10-CM

## 2019-05-09 PROCEDURE — 99212 OFFICE O/P EST SF 10 MIN: CPT | Performed by: OBSTETRICS & GYNECOLOGY

## 2019-05-14 LAB
CYTOLOGIST CVX/VAG CYTO: ABNORMAL
CYTOLOGY CVX/VAG DOC CYTO: ABNORMAL
CYTOLOGY CVX/VAG DOC THIN PREP: ABNORMAL
DX ICD CODE: ABNORMAL
DX ICD CODE: ABNORMAL
HIV 1 & 2 AB SER-IMP: ABNORMAL
HPV I/H RISK 1 DNA CVX QL PROBE+SIG AMP: NEGATIVE
OTHER STN SPEC: ABNORMAL
PATHOLOGIST CVX/VAG CYTO: ABNORMAL
RECOM F/U CVX/VAG CYTO: ABNORMAL
STAT OF ADQ CVX/VAG CYTO-IMP: ABNORMAL

## 2019-05-16 NOTE — PROGRESS NOTES
Notify Elena that her Pap smear showed mild dysplasia, and is negative for high-risk HPV.  Nevertheless she needs to have a colposcopy to evaluate the dysplasia.  I would suggest perhaps this Thursday afternoon or Friday.

## 2019-05-20 ENCOUNTER — TELEPHONE (OUTPATIENT)
Dept: CARDIOLOGY | Facility: CLINIC | Age: 59
End: 2019-05-20

## 2019-05-20 NOTE — TELEPHONE ENCOUNTER
Pt is calling regarding about an email that was sent last week:    Tonight I played pickle ball & my heart was pounding the whole time. I also had indigestion, which I thought was because I had just eaten & drank a large sweet tea.  I finished playing a little before 7:00 PM. And my heart rate was  over 180 after I had been sitting down for about 5 -10 minutes. It then stayed up around 140 and I came home & took an entire 25 mg metoprolol tablet.  It took about 30 to 40 minutes for my heart rate to calm down. I think this may have been another episode of a fib? What are your thoughts on this? I  might add I am guilty of not drinking enough water & staying hydrated also.  I’m OK now and don’t think I need to come in and see you, but I just want to try to understand why it happened & how I should  avoid it.       Please advise. PT #: [phone]    Thanks Cherelle

## 2019-06-05 ENCOUNTER — OFFICE VISIT (OUTPATIENT)
Dept: OBSTETRICS AND GYNECOLOGY | Age: 59
End: 2019-06-05

## 2019-06-05 DIAGNOSIS — N87.0 MILD DYSPLASIA OF CERVIX (CIN I): Primary | ICD-10-CM

## 2019-06-05 PROCEDURE — 57456 ENDOCERV CURETTAGE W/SCOPE: CPT | Performed by: OBSTETRICS & GYNECOLOGY

## 2019-06-05 NOTE — PROGRESS NOTES
COLPOSCOPY EXAM                         June 5, 2019.     Subjective        Xochilt Whittington is a 58 y.o., G 4, P 2022,  Female    Indication: Follow-up for repeat Pap on May 9, 2019 showed mild dysplasia, negative for HR-HPV.  Last annual exam was November 1, 2018.    Pap Smear History:   [id number], Normal yearly Paps.   1998, ASCUS.   1998, '99X2, 7364-2615, Normal yearly Paps.  2014, Neg Pap, Neg HR-HPV.    2015, Neg Pap, Pos HPV, endometrial cells present.    2016, ASCUS, Pos HR-HPV, Neg Genotypes 16 and 18.  Colpo bx's= Neg ECC, Neg Bx's, normal endometrium.    2017 Neg Pap, Neg HR-HPV.    2018, ASCUS, Neg HR-HPV.    : Reviewed in detail.  Pictures and diagrams made.    Objective     OBGyn Exam        There were no vitals taken for this visit.    EXAM       Vulva: Normal.    COLPOSCOPY  Speculum placed in vagina, visualization of the cervix is adequate.   3 cotton balls soaked with vinegar placed in the vagina and left for 1 minute or more, then removed.     Examination of the cervix with the colposcope revealed:    PORTIO OF THE CERVIX=  is normal.  No white epithelium, mosaic, punctation, or atypical vessels seen.  SCJ= presents just inside the margin of the external os.    ENDOCERVICAL CANAL evaluated with a Wesley Endocervical Speculum and Q-tips with vinegar= No abnormalities seen.     TISSUE SAMPLES obtained: 1.  Endocervical curettings obtained with a Kevradhaian curette (ECC).  No cervical biopsy(s).     VAGINA was examined as the speculum was removed slowly: No abnormalities were seen.     EXTERNAL GENITALIA, VULVA, AND PERIANAL AREA were then examined with the colposcope= No abnormalities were seen.       ADEQUACY OF EXAM: Adequate.    The entire transformation zone and endocervical canal were visualized .  The vagina and vulva were completely visualized.     Assessment/Plan   Xochilt was seen today for colposcopy.    Diagnoses and all orders for this visit:    Mild dysplasia of  cervix (JAVIER I)  -     Colposcopy  -     Cancel: Reference Histopathology  -     Reference Histopathology        COLPOSCOPY ASSESSMENT  FINDINGS:  Endocervical canal normal.   Cervix was normal.   Vagina normal.  Vulva normal.     The patient tolerated the procedure very well.   The findings were explained, and drawings were made.  All questions were answered, the patient demonstrated understanding.    PLAN: This colposcopic exam was normal.  If mild dysplasia is found on ECC, I would repeat Pap smear in 3-6 months, since the Pap smear with LGSIL was negative for HR-HPV.  If the ECC is negative, can repeat Pap in 1 year.    Good Grewal MD    6/9/2019  3:27 PM (late note completion).

## 2019-06-12 LAB
DX ICD CODE: NORMAL
PATH REPORT.FINAL DX SPEC: NORMAL
PATH REPORT.GROSS SPEC: NORMAL
PATH REPORT.SITE OF ORIGIN SPEC: NORMAL
PATHOLOGIST NAME: NORMAL
PAYMENT PROCEDURE: NORMAL

## 2019-06-30 NOTE — PROGRESS NOTES
June 30, 2019.  1:55 AM  Notify Elena that her ECC was negative.  The cervix looked completely normal at colposcopy.  Since there was no HPV seen at her Pap smear that showed the mild dysplasia I would recommend just repeating her Pap smear in 1 year at her next annual visit.

## 2019-07-02 ENCOUNTER — TELEPHONE (OUTPATIENT)
Dept: OBSTETRICS AND GYNECOLOGY | Age: 59
End: 2019-07-02

## 2019-07-02 NOTE — TELEPHONE ENCOUNTER
----- Message from Good Grewal MD sent at 6/30/2019  1:56 AM EDT -----  June 30, 2019.  1:55 AM  Notify Elena that her ECC was negative.  The cervix looked completely normal at colposcopy.  Since there was no HPV seen at her Pap smear that showed the mild dysplasia I would recommend just repeating her Pap smear in 1 year at her next annual visit.

## 2019-08-19 ENCOUNTER — TELEPHONE (OUTPATIENT)
Dept: OBSTETRICS AND GYNECOLOGY | Age: 59
End: 2019-08-19

## 2019-08-19 DIAGNOSIS — Z78.0 MENOPAUSE: ICD-10-CM

## 2019-08-19 DIAGNOSIS — Z79.890 POST-MENOPAUSE ON HRT (HORMONE REPLACEMENT THERAPY): ICD-10-CM

## 2019-08-19 NOTE — TELEPHONE ENCOUNTER
Former benedict pt seeing you in November.    Pharmacy faxing refill request for progesterone 100mg    90 day supply with refills

## 2019-08-19 NOTE — TELEPHONE ENCOUNTER
Dr Godinez former Dr Grewal pt states is requesting a new Progesterone prescriptions as she needs enough of this medication until her scheduled appt 11/26/19 w Dr Godinez. Please send to [facility] on file.

## 2019-09-16 RX ORDER — METOPROLOL SUCCINATE 25 MG/1
TABLET, EXTENDED RELEASE ORAL
Qty: 45 TABLET | Refills: 2 | Status: SHIPPED | OUTPATIENT
Start: 2019-09-16 | End: 2020-06-24

## 2019-10-10 ENCOUNTER — TELEPHONE (OUTPATIENT)
Dept: OBSTETRICS AND GYNECOLOGY | Age: 59
End: 2019-10-10

## 2019-10-10 NOTE — TELEPHONE ENCOUNTER
Former pt of Dr Grewal will be pt of Dr MAGO smytht in Nov. The HRT estradiol patch 0.025mg and progesterone 100 mg is causing high blood pressure, her cardiologist is recommending she be weaned off HRT, pt is asking what is the best way to do that? Please advise

## 2019-10-25 ENCOUNTER — TELEPHONE (OUTPATIENT)
Dept: OBSTETRICS AND GYNECOLOGY | Age: 59
End: 2019-10-25

## 2019-10-25 NOTE — TELEPHONE ENCOUNTER
Dr delgadillo pt (former dr mcmullen) has been advised by cardiologist to d/c her hormone patch pt states she will be weaned off by end of Oct. Requesting we send over a compound estrogen cream that Dr Mcmullen had given in the past, to [facility] [phone]

## 2019-10-25 NOTE — TELEPHONE ENCOUNTER
This should wait for Dr Godinez.  If they are suggesting coming off the patch he may not feel comfortable with a cream either. Please send to him for review.

## 2019-10-28 RX ORDER — ESTRADIOL 0.1 MG/G
CREAM VAGINAL
Qty: 42.5 G | Refills: 4 | Status: SHIPPED | OUTPATIENT
Start: 2019-10-28 | End: 2019-11-26 | Stop reason: SDUPTHER

## 2019-10-28 NOTE — TELEPHONE ENCOUNTER
The only cream that she can use since her cardiologist recommended her coming off of that is a vaginal cream and I will send that in

## 2019-10-28 NOTE — TELEPHONE ENCOUNTER
Confirmed with Dr Godinez he meant estriol. Called in Estriol 0.05 % cream 30 gram, apply with finger tip or insert 0.5 g vag 3x week with 4 refills.

## 2019-10-28 NOTE — TELEPHONE ENCOUNTER
Pt called back requesting we call  in Rx Estriol 0.05  to compound pharmacy [facility], [phone]. Please advise    Okay to call pharmacy with estradiol 0.05% vaginal cream 30 g, apply with fingertip or insert 0.5 g vaginally 3 times a week   4 refills

## 2019-11-11 ENCOUNTER — TELEPHONE (OUTPATIENT)
Dept: OBSTETRICS AND GYNECOLOGY | Age: 59
End: 2019-11-11

## 2019-11-11 DIAGNOSIS — R92.2 DENSE BREAST TISSUE: Primary | ICD-10-CM

## 2019-11-11 NOTE — TELEPHONE ENCOUNTER
([facility]/ Link pt) Pt is wondering if you can look at her past MG screenings and advise wether or not it would be beneficial to schedule a 3D MG because she has been told she has dense breast but Dr. Grewal would never suggest the 3D. Also because she has been on hormone replacement for a year and a half     [phone]      Okay to notify patient that I think a 3D mammogram is reasonable.  The literature is not totally clear that it provides a specific advantage however it is a reasonable test.  I have put that order in

## 2019-11-25 ENCOUNTER — APPOINTMENT (OUTPATIENT)
Dept: WOMENS IMAGING | Facility: HOSPITAL | Age: 59
End: 2019-11-25

## 2019-11-25 PROCEDURE — 77067 SCR MAMMO BI INCL CAD: CPT | Performed by: RADIOLOGY

## 2019-11-25 PROCEDURE — 77063 BREAST TOMOSYNTHESIS BI: CPT | Performed by: RADIOLOGY

## 2019-11-26 ENCOUNTER — OFFICE VISIT (OUTPATIENT)
Dept: OBSTETRICS AND GYNECOLOGY | Age: 59
End: 2019-11-26

## 2019-11-26 VITALS
DIASTOLIC BLOOD PRESSURE: 72 MMHG | HEIGHT: 70 IN | BODY MASS INDEX: 20.9 KG/M2 | WEIGHT: 146 LBS | SYSTOLIC BLOOD PRESSURE: 110 MMHG

## 2019-11-26 DIAGNOSIS — Z71.89 COUNSELING FOR HORMONE REPLACEMENT THERAPY: ICD-10-CM

## 2019-11-26 DIAGNOSIS — I48.0 PAROXYSMAL ATRIAL FIBRILLATION (HCC): Chronic | ICD-10-CM

## 2019-11-26 DIAGNOSIS — N95.2 ATROPHIC VAGINITIS: ICD-10-CM

## 2019-11-26 DIAGNOSIS — Z01.419 ENCOUNTER FOR GYNECOLOGICAL EXAMINATION: Primary | ICD-10-CM

## 2019-11-26 PROCEDURE — 99396 PREV VISIT EST AGE 40-64: CPT | Performed by: OBSTETRICS & GYNECOLOGY

## 2019-11-26 RX ORDER — CHLORAL HYDRATE 500 MG
CAPSULE ORAL
COMMUNITY
End: 2020-06-24

## 2019-11-26 RX ORDER — ESTRADIOL 0.1 MG/G
CREAM VAGINAL
Qty: 42.5 G | Refills: 4 | Status: SHIPPED | OUTPATIENT
Start: 2019-11-26 | End: 2020-06-24

## 2019-11-26 NOTE — PROGRESS NOTES
Subjective   Chief Complaint   Patient presents with   • Gynecologic Exam     Annual:last pap ,mammo 19 @[facility],colonoscopy ,dexa       History of Present Illness    Xochilt Whittington is a very pleasant  59 y.o. female who presents for annual exam.  , [facility], normal, , Exercise 7 times a week.  Patient includes walking her dog playing pickle ball.  Overall she is very healthy but has had some PAT and transient hypertension.  Cardiology recommended no estrogens systemically, but is okay with vaginally applied estradiol which she has been on for atrophic vaginitis she is actually going to [place] for further cardiology studies    Obstetric History:  OB History      Para Term  AB Living    4 2 2   2 2    SAB TAB Ectopic Molar Multiple Live Births              2         Menstrual History:     No LMP recorded. Patient is postmenopausal.       Sexual History:       Past Medical History:   Diagnosis Date   • Anxiety and depression     Was on antidepresssants, ended about a year ago, .   • Atypical squamous cells of undetermined significance (ASCUS) on Papanicolaou smear of cervix , 2016    , ASCUS.  Subsequent Paps normal.  2016, ASCUS, positive HR-HPV, duhosign21 and 18.  Colpo biopsies negative.([place]).   • Colon polyps    • Diverticulosis of colon     Asymptomatic.   • Encounter for IUD removal 2014    Removed due to spotting with Mirena.  FSH = 50.6   • Eye injury     right eye (as a child)   • Family history of colon cancer     Father had colon ca in his early 50's.   • History of Papanicolaou smear of cervix [id number], Neg Pap, Neg HR-HPV.  , Neg Pap, Pos HPV, endometrial cells present.  2016, ASCUS, Pos HR-HPV, Neg Genotypes 16 and 18.  Colpo bx's= Neg ECC, Neg Bx's, normal endometrium.   Neg Pap, Neg HR-HPV.  2018, ASCUS, Neg HR-HPV   • History of Papanicolaou smear of cervix [id number]    Pap  "smear Hx:6017-1314, Normal yearly Paps. 1998, ASCUS. 1998, '99X2, 5074-7978, Normal yearly Paps.   • Hx of bone density study 2018    2018: DEXA scan= moderate osteopenia in L1 and right femoral neck.  T scores: L1 -1.8; L2 -0.8; L3 -0.1; L4 by 0.4.  Right femoral neck -1.8; total right hip -1.9   2008 DEXA scan: Mild lumbar osteopenia and moderate to severe osteopenia and right femoral neck.  T scores: L1 -1.5; L2 -1.3; L3 -0.4; L4 -1.3.  Right femoral neck -2.0.  Total right femur -1.9.   • Insomnia     More of a \"bad sleeper\". Awakens early AM.   • PAF (paroxysmal atrial fibrillation) (CMS/[facility]) 08/25/2018    Sudden onset this year, uncertain etiology, except she was playin pickle ball, and was very dehydrated as evidenced by elevated BUN, creatinine, and hemoglobin.  She converted with IV fluids.  Placed on Low dose Toprolol, no anticoagulants.  Had follow-up evaluation by Dr. Saeed Barba.   • PCB (post coital bleeding) 2014    Intermittantly with IUD, Resolved after removal of IUD.   • PMB (postmenopausal bleeding) 2015    Endometrial biopsies= Normal. While in [place]. None since.   • Vaginal delivery     1988  \"JOAN\"    1992  \"PAUL\"     Past Surgical History:   Procedure Laterality Date   • BREAST BIOPSY Right 1989    20 yr ago, benign clogged duct.   • COLONOSCOPY W/ BIOPSIES  2013    x3: Started at age 38 with personal hx of polyps and family history. Last 'scope, Sept 17, 2018= No polyps.   • DENTAL PROCEDURE     • HIP SURGERY  1974    @ age 14: Fx hip: Placed hip pins, then removed three years later.   • TONSILLECTOMY  1964    Removed at 4 yoa.       SOCIAL Hx:      The following portions of the patient's history were reviewed and updated as appropriate: allergies, current medications, past family history, past medical history, past social history, past surgical history and problem list.    Review of Systems        Except as outlined in history of physical illness, patient denies any changes in her " "GYN, , GI systems.  All other systems reviewed are negative         Objective   Physical Exam    /72   Ht 177.8 cm (70\")   Wt 66.2 kg (146 lb)   Breastfeeding? No   BMI 20.95 kg/m²     General: Patient is alert and oriented and appears overall healthy  Neck: Is supple without thyromegaly, no carotid bruits and no lymphadenopathy  Lungs: Clear bilaterally, no wheezing, rhonchi, or rales.  Respiratory rate is normal  Breast: Even symmetrical, no lymphadenopathy, no retraction, no masses appreciated on either side  Heart: Regular rate and rhythm are appreciated, no murmurs or rubs are heard  Abdomen: Is soft, without organomegaly, bowel sounds are positive, there is no                                rebound or guarding and palpation does not produce any discomfort  Back: Nontender without CVA tenderness  Pelvic: External genitalia appear normal and consistent with mature female.  BUS normal              Urethra appears normal and without mass, bladder is nontender and without any               Masses.               Vagina is clean dry without discharge and appears adequately estrogenized, no               lesions or masses are present                         Cervix is noninflamed without discharge or lesions.  There is no cervical motion             tenderness.                Uterus is nonenlarged, without tenderness, and no masses or abnormalities are  present               Adnexa are non-enlarged, non tender               Rectal exam reveals adequate sphincter tone and no masses or lesions are                     appreciated on digital rectal examination.      Annual Well Woman Exam     Patient Active Problem List   Diagnosis   • Paroxysmal atrial fibrillation (CMS/HCC)   • Osteopenia of multiple sites   • Colon polyps   • Family history of colon cancer   • Atypical squamous cells of undetermined significance (ASCUS) on Papanicolaou smear of cervix                Assessment/Plan   Xochilt was seen today " for gynecologic exam.    Diagnoses and all orders for this visit:    Encounter for gynecological examination  -     IGP, Apt HPV,rfx 16 / 18,45  -     Mammo Screening Digital Tomosynthesis Bilateral With CAD; Future    Paroxysmal atrial fibrillation (CMS/HCC)  Patient is being followed by cardiology, may go to [place] for further studies, discussed some less expensive cardiac calcium testing as patient had questions about the cost of some her cardiologist recommendations.  Counseling for hormone replacement therapy  Recommend no systemic estrogen or hormone replacement therapy.  Atrophic vaginitis    Other orders  -     estradiol (ESTRACE VAGINAL) 0.1 MG/GM vaginal cream; Apply 1 grams     (.1mg)    2-3 times  weekly      Discussed today's findings and concerns with patient.  Continue to recommend regular exercise including cardiovascular and resistance training as well as  breast self-exam. Wellness lab, mammography, & pap smear, in accordance with age guidelines.    I have encouraged her to call for today's test results if she has not received them within 10 days.  Patient is advised to call with any change in her condition or with any other questions, otherwise return  for annual examination.

## 2019-12-02 LAB
CYTOLOGIST CVX/VAG CYTO: ABNORMAL
CYTOLOGY CVX/VAG DOC CYTO: ABNORMAL
CYTOLOGY CVX/VAG DOC THIN PREP: ABNORMAL
DX ICD CODE: ABNORMAL
DX ICD CODE: ABNORMAL
HIV 1 & 2 AB SER-IMP: ABNORMAL
HPV I/H RISK 4 DNA CVX QL PROBE+SIG AMP: NEGATIVE
OTHER STN SPEC: ABNORMAL
PATHOLOGIST CVX/VAG CYTO: ABNORMAL
STAT OF ADQ CVX/VAG CYTO-IMP: ABNORMAL

## 2019-12-10 ENCOUNTER — TELEPHONE (OUTPATIENT)
Dept: OBSTETRICS AND GYNECOLOGY | Age: 59
End: 2019-12-10

## 2019-12-10 NOTE — TELEPHONE ENCOUNTER
Pt states that she [facility] and can see that it is abnormal. She said this has happened before with her pap and she is concerned and wants to know if she needs to go ahead and schedule to c/i for further pap testing? I notified pt that Dr Godinez has not signed off on her pap yet so we would need to wait for his recommendation.

## 2019-12-10 NOTE — TELEPHONE ENCOUNTER
Please advise.      Response-please notify patient that her Pap smear showed a nonspecific change that we commonly see, fortunately, we reflexively ordered a higher risk screen on that Pap smear and everything is normal.  Okay to return for next years Pap smear

## 2020-06-24 ENCOUNTER — OFFICE VISIT (OUTPATIENT)
Dept: OBSTETRICS AND GYNECOLOGY | Age: 60
End: 2020-06-24

## 2020-06-24 VITALS
DIASTOLIC BLOOD PRESSURE: 74 MMHG | SYSTOLIC BLOOD PRESSURE: 118 MMHG | BODY MASS INDEX: 21.9 KG/M2 | HEIGHT: 70 IN | WEIGHT: 153 LBS

## 2020-06-24 DIAGNOSIS — Z11.3 SCREEN FOR STD (SEXUALLY TRANSMITTED DISEASE): Primary | ICD-10-CM

## 2020-06-24 PROCEDURE — 99213 OFFICE O/P EST LOW 20 MIN: CPT | Performed by: PHYSICIAN ASSISTANT

## 2020-06-25 LAB
HBV SURFACE AG SERPL QL IA: NEGATIVE
HCV AB S/CO SERPL IA: <0.1 S/CO RATIO (ref 0–0.9)
HIV 1+2 AB+HIV1 P24 AG SERPL QL IA: NON REACTIVE
RPR SER QL: NON REACTIVE

## 2020-06-30 ENCOUNTER — TELEPHONE (OUTPATIENT)
Dept: OBSTETRICS AND GYNECOLOGY | Age: 60
End: 2020-06-30

## 2020-06-30 LAB
A VAGINAE DNA VAG QL NAA+PROBE: NORMAL SCORE
BVAB2 DNA VAG QL NAA+PROBE: NORMAL SCORE
C ALBICANS DNA VAG QL NAA+PROBE: NEGATIVE
C GLABRATA DNA VAG QL NAA+PROBE: NEGATIVE
C TRACH DNA VAG QL NAA+PROBE: NEGATIVE
MEGA1 DNA VAG QL NAA+PROBE: NORMAL SCORE
N GONORRHOEA DNA VAG QL NAA+PROBE: NEGATIVE
T VAGINALIS DNA VAG QL NAA+PROBE: NEGATIVE

## 2020-06-30 NOTE — TELEPHONE ENCOUNTER
----- Message from DHAVAL Lizarraga sent at 6/30/2020 11:31 AM EDT -----  Notify pt that her vaginal swab is neg for bv, yeast and std's

## 2020-11-03 ENCOUNTER — APPOINTMENT (OUTPATIENT)
Dept: WOMENS IMAGING | Facility: HOSPITAL | Age: 60
End: 2020-11-03

## 2020-11-03 PROCEDURE — 77063 BREAST TOMOSYNTHESIS BI: CPT | Performed by: RADIOLOGY

## 2020-11-03 PROCEDURE — 77067 SCR MAMMO BI INCL CAD: CPT | Performed by: RADIOLOGY

## 2020-11-05 ENCOUNTER — TELEPHONE (OUTPATIENT)
Dept: OBSTETRICS AND GYNECOLOGY | Age: 60
End: 2020-11-05

## 2020-11-05 NOTE — TELEPHONE ENCOUNTER
----- Message from Good Godinez MD sent at 11/5/2020 11:04 AM EST -----  Please notify pt. That labs are with in normal limits, mammogram was normal Pap smear was okay and negative for HPV we will simply repeat that next year.

## 2020-11-10 ENCOUNTER — TELEPHONE (OUTPATIENT)
Dept: OBSTETRICS AND GYNECOLOGY | Age: 60
End: 2020-11-10

## 2020-12-02 ENCOUNTER — OFFICE VISIT (OUTPATIENT)
Dept: OBSTETRICS AND GYNECOLOGY | Age: 60
End: 2020-12-02

## 2020-12-02 VITALS
BODY MASS INDEX: 22.05 KG/M2 | HEIGHT: 70 IN | SYSTOLIC BLOOD PRESSURE: 120 MMHG | WEIGHT: 154 LBS | DIASTOLIC BLOOD PRESSURE: 70 MMHG

## 2020-12-02 DIAGNOSIS — Z12.31 ENCOUNTER FOR SCREENING MAMMOGRAM FOR MALIGNANT NEOPLASM OF BREAST: Primary | ICD-10-CM

## 2020-12-02 DIAGNOSIS — Z01.419 ENCOUNTER FOR GYNECOLOGICAL EXAMINATION: ICD-10-CM

## 2020-12-02 DIAGNOSIS — N76.3 SUBACUTE VULVITIS: ICD-10-CM

## 2020-12-02 PROCEDURE — 99396 PREV VISIT EST AGE 40-64: CPT | Performed by: OBSTETRICS & GYNECOLOGY

## 2020-12-02 RX ORDER — FLECAINIDE ACETATE 50 MG/1
TABLET ORAL
COMMUNITY
Start: 2020-11-14 | End: 2020-12-02

## 2020-12-02 RX ORDER — VALACYCLOVIR HYDROCHLORIDE 1 G/1
TABLET, FILM COATED ORAL
COMMUNITY
Start: 2020-09-23 | End: 2020-12-02

## 2020-12-02 RX ORDER — FLUCONAZOLE 150 MG/1
150 TABLET ORAL DAILY
Qty: 3 TABLET | Refills: 0 | Status: SHIPPED | OUTPATIENT
Start: 2020-12-02 | End: 2020-12-05

## 2020-12-02 RX ORDER — IBUPROFEN 200 MG
1 CAPSULE ORAL DAILY
COMMUNITY

## 2020-12-02 RX ORDER — CARVEDILOL 3.12 MG/1
TABLET ORAL
COMMUNITY
Start: 2020-11-14

## 2020-12-02 NOTE — PROGRESS NOTES
Subjective   Chief Complaint   Patient presents with   • Gynecologic Exam     Annual:Last pap ,dexa ,colonoscopy ,Discuss yeast infection      History of Present Illness    Xochilt Whittington is a very pleasant  60 y.o. female who presents for annual exam.  , Mammo Exam 2 weeks ago and patient was told it was normal, , Exercise 5 times a week  Patient overall is doing well she is moving back to [place] to get remarried.  She had vulvitis diagnosed by her PCP but was not treated was requesting a refill on her Diflucan.  She is up-to-date on her colonoscopy and DEXA scan.  Her last Pap smear was ASCUS with negative HPV but she has had a history of HPV in the past and her fiancé has had some type of throat or neck cancer that was associated with HPV.  Patient's had no postmenopausal bleeding.  She has no gynecological concerns otherwise.    Obstetric History:  OB History        4    Para   2    Term   2            AB   2    Living   2       SAB        TAB        Ectopic        Molar        Multiple        Live Births   2               Menstrual History:     No LMP recorded. Patient is postmenopausal.       Sexual History:       Past Medical History:   Diagnosis Date   • Anxiety and depression 2016    Was on antidepresssants, ended about a year ago, 2016.   • Atypical squamous cells of undetermined significance (ASCUS) on Papanicolaou smear of cervix , 2016    1998, ASCUS.  Subsequent Paps normal.  2016, ASCUS, positive HR-HPV, iivininq10 and 18.  Colpo biopsies negative.([place]).   • Colon polyps    • Diverticulosis of colon     Asymptomatic.   • Encounter for IUD removal 2014    Removed due to spotting with Mirena.  FSH = 50.6   • Eye injury     right eye (as a child)   • Family history of colon cancer     Father had colon ca in his early 50's.   • History of Papanicolaou smear of cervix [id number], Neg Pap, Neg HR-HPV.  , Neg Pap, Pos HPV, endometrial cells present.  " 2016, ASCUS, Pos HR-HPV, Neg Genotypes 16 and 18.  Colpo bx's= Neg ECC, Neg Bx's, normal endometrium.  2017 Neg Pap, Neg HR-HPV.  2018, ASCUS, Neg HR-HPV   • History of Papanicolaou smear of cervix [id number]    Pap smear Hx:7632-2924, Normal yearly Paps. 1998, ASCUS. 1998, '99X2, 3950-3880, Normal yearly Paps.   • Hx of bone density study 2018    2018: DEXA scan= moderate osteopenia in L1 and right femoral neck.  T scores: L1 -1.8; L2 -0.8; L3 -0.1; L4 by 0.4.  Right femoral neck -1.8; total right hip -1.9   2008 DEXA scan: Mild lumbar osteopenia and moderate to severe osteopenia and right femoral neck.  T scores: L1 -1.5; L2 -1.3; L3 -0.4; L4 -1.3.  Right femoral neck -2.0.  Total right femur -1.9.   • Insomnia     More of a \"bad sleeper\". Awakens early AM.   • PAF (paroxysmal atrial fibrillation) (CMS/[facility]) 08/25/2018    Sudden onset this year, uncertain etiology, except she was playin pickle ball, and was very dehydrated as evidenced by elevated BUN, creatinine, and hemoglobin.  She converted with IV fluids.  Placed on Low dose Toprolol, no anticoagulants.  Had follow-up evaluation by Dr. Saeed Barba.   • PCB (post coital bleeding) 2014    Intermittantly with IUD, Resolved after removal of IUD.   • PMB (postmenopausal bleeding) 2015    Endometrial biopsies= Normal. While in [place]. None since.   • Vaginal delivery     1988  \"JOAN\"    1992  \"PAUL\"     Past Surgical History:   Procedure Laterality Date   • BREAST BIOPSY Right 1989    20 yr ago, benign clogged duct.   • COLONOSCOPY W/ BIOPSIES  2013    x3: Started at age 38 with personal hx of polyps and family history. Last 'scope, Sept 17, 2018= No polyps.   • DENTAL PROCEDURE     • HIP SURGERY  1974    @ age 14: Fx hip: Placed hip pins, then removed three years later.   • TONSILLECTOMY  1964    Removed at 4 yoa.       SOCIAL Hx:      The following portions of the patient's history were reviewed and updated as appropriate: allergies, current medications, " "past family history, past medical history, past social history, past surgical history and problem list.    Review of Systems        Except as outlined in history of physical illness, patient denies any changes in her GYN, , GI systems.  All other systems reviewed are negative         Objective   Physical Exam    /70   Ht 177.8 cm (70\")   Wt 69.9 kg (154 lb)   Breastfeeding No   BMI 22.10 kg/m²     General: Patient is alert and oriented and appears overall healthy  Neck: Is supple without thyromegaly, no carotid bruits and no lymphadenopathy  Lungs: Clear bilaterally, no wheezing, rhonchi, or rales.  Respiratory rate is normal  Breast: Even symmetrical, no lymphadenopathy, no retraction, no masses appreciated on either side  Heart: Regular rate and rhythm are appreciated, no murmurs or rubs are heard  Abdomen: Is soft, without organomegaly, bowel sounds are positive, there is no                                rebound or guarding and palpation does not produce any discomfort  Back: Nontender without CVA tenderness  Pelvic: External genitalia appear normal and consistent with mature female.  BUS normal              Urethra appears normal and without mass, bladder is nontender and without any               Masses.               Vagina is clean dry without discharge and appears adequately estrogenized, no               lesions or masses are present                         Cervix is noninflamed without discharge or lesions.  There is no cervical motion             tenderness.                Uterus is nonenlarged, without tenderness, and no masses or abnormalities are  present               Adnexa are non-enlarged, non tender               Rectal exam reveals adequate sphincter tone and no masses or lesions are                     appreciated on digital rectal examination.      Annual Well Woman Exam     Patient Active Problem List   Diagnosis   • Paroxysmal atrial fibrillation (CMS/HCC)   • Osteopenia of " multiple sites   • Colon polyps   • Family history of colon cancer   • Atypical squamous cells of undetermined significance (ASCUS) on Papanicolaou smear of cervix                Assessment/Plan   Diagnoses and all orders for this visit:    1. Encounter for screening mammogram for malignant neoplasm of breast (Primary)  -     Mammo Screening Digital Tomosynthesis Bilateral With CAD; Future    2. Encounter for gynecological examination  -     IGP, Apt HPV,rfx 16 / 18,45    3. Subacute vulvitis    Other orders  -     fluconazole (Diflucan) 150 MG tablet; Take 1 tablet by mouth Daily for 3 days.  Dispense: 3 tablet; Refill: 0      Discussed today's findings and concerns with patient.  Continue to recommend regular exercise including cardiovascular and resistance training as well as  breast self-exam. Wellness lab, mammography, & pap smear, in accordance with age guidelines.    I have encouraged her to call for today's test results if she has not received them within 10 days.  Patient is advised to call with any change in her condition or with any other questions, otherwise return  for annual examination.

## 2020-12-07 LAB
CYTOLOGIST CVX/VAG CYTO: ABNORMAL
CYTOLOGY CVX/VAG DOC CYTO: ABNORMAL
CYTOLOGY CVX/VAG DOC THIN PREP: ABNORMAL
DX ICD CODE: ABNORMAL
DX ICD CODE: ABNORMAL
HIV 1 & 2 AB SER-IMP: ABNORMAL
HPV I/H RISK 4 DNA CVX QL PROBE+SIG AMP: NEGATIVE
OTHER STN SPEC: ABNORMAL
PATHOLOGIST CVX/VAG CYTO: ABNORMAL
RECOM F/U CVX/VAG CYTO: ABNORMAL
STAT OF ADQ CVX/VAG CYTO-IMP: ABNORMAL

## 2020-12-17 ENCOUNTER — TELEPHONE (OUTPATIENT)
Dept: OBSTETRICS AND GYNECOLOGY | Age: 60
End: 2020-12-17

## 2020-12-17 NOTE — TELEPHONE ENCOUNTER
She does not have to do anything different, Pap smear was okay we further tested with HPV and that was all negative, simply want to repeat it next year as discussed

## 2020-12-17 NOTE — TELEPHONE ENCOUNTER
Pt called questioning her pap results. Assured pt that if Dr. Godinez thinks there are any precautions she needs to take, he will let us know to reach out to her. I did not see any notes on her pap just yet. Pt states its okay to leave a detailed VM if we try calling her back with more information and she does not answer.

## 2021-10-12 ENCOUNTER — APPOINTMENT (OUTPATIENT)
Dept: WOMENS IMAGING | Facility: HOSPITAL | Age: 61
End: 2021-10-12

## 2021-10-12 PROCEDURE — 77063 BREAST TOMOSYNTHESIS BI: CPT | Performed by: RADIOLOGY

## 2021-10-12 PROCEDURE — 77067 SCR MAMMO BI INCL CAD: CPT | Performed by: RADIOLOGY

## 2021-10-13 ENCOUNTER — OFFICE VISIT (OUTPATIENT)
Dept: OBSTETRICS AND GYNECOLOGY | Age: 61
End: 2021-10-13

## 2021-10-13 VITALS
DIASTOLIC BLOOD PRESSURE: 72 MMHG | HEIGHT: 70 IN | WEIGHT: 150 LBS | BODY MASS INDEX: 21.47 KG/M2 | SYSTOLIC BLOOD PRESSURE: 118 MMHG

## 2021-10-13 DIAGNOSIS — R87.610 ATYPICAL SQUAMOUS CELLS OF UNDETERMINED SIGNIFICANCE (ASCUS) ON PAPANICOLAOU SMEAR OF CERVIX: ICD-10-CM

## 2021-10-13 DIAGNOSIS — Z12.4 SCREENING FOR CERVICAL CANCER: ICD-10-CM

## 2021-10-13 DIAGNOSIS — Z01.419 WELL WOMAN EXAM WITH ROUTINE GYNECOLOGICAL EXAM: Primary | ICD-10-CM

## 2021-10-13 DIAGNOSIS — Z80.0 FAMILY HISTORY OF COLON CANCER: ICD-10-CM

## 2021-10-13 PROCEDURE — 99396 PREV VISIT EST AGE 40-64: CPT | Performed by: NURSE PRACTITIONER

## 2021-10-13 RX ORDER — FLECAINIDE ACETATE 50 MG/1
TABLET ORAL
COMMUNITY
Start: 2021-07-24

## 2021-10-13 RX ORDER — ESTRADIOL 0.1 MG/G
CREAM VAGINAL
Qty: 42.5 G | Refills: 4 | Status: SHIPPED | OUTPATIENT
Start: 2021-10-13 | End: 2022-06-07 | Stop reason: SDUPTHER

## 2021-10-13 NOTE — PROGRESS NOTES
Subjective     Chief Complaint   Patient presents with   • Gynecologic Exam     AE, last pap 2020 ASCUS/ hpv neg, mg 2020 dexa 2018, csy 2018       History of Present Illness    Xochilt Martinez is a 61 y.o.  who presents for annual exam.    Doing well  Pap last year was ascus, neg hpv  Mammo - had done yesterday at [facility]   Colonoscopy up to date - done every 5 years  Dexa  - osteopenia, taking calcium and vitamin D   Postmenopausal - denies vaginal bleeding, HF or NS  Using vaginal estrogen three times weekly - needs refilled   Saw her PCP this morning - Dr. Smitha Maloney  Family hx of colon cancer  Pt of Dr. Godinez    Obstetric History:  OB History        4    Para   2    Term   2            AB   2    Living   2       SAB        IAB        Ectopic        Molar        Multiple        Live Births   2               Menstrual History:     No LMP recorded. Patient is postmenopausal.         Current contraception: post menopausal status  History of abnormal Pap smear: yes - ascus, neg hpv   Received Gardasil immunization: no  Perform regular self breast exam: yes - regularly  Family history of uterine or ovarian cancer: no  Family History of colon cancer: yes - see hx  Family history of breast cancer: no    Mammogram: up to date.  Colonoscopy: up to date.  DEXA: up to date.    Exercise: very active  Calcium/Vitamin D: uses supplements    The following portions of the patient's history were reviewed and updated as appropriate: allergies, current medications, past family history, past medical history, past social history, past surgical history and problem list.    Review of Systems   Constitutional: Negative.    Respiratory: Negative.    Cardiovascular: Negative.    Gastrointestinal: Negative.    Genitourinary: Negative.    Skin: Negative.    Psychiatric/Behavioral: Negative.            Objective   Physical Exam  Constitutional:       General: She is awake.      Appearance: Normal  appearance. She is well-developed.   HENT:      Head: Normocephalic and atraumatic.      Nose: Nose normal.   Neck:      Thyroid: No thyroid mass, thyromegaly or thyroid tenderness.   Cardiovascular:      Rate and Rhythm: Normal rate and regular rhythm.      Pulses: Normal pulses.      Heart sounds: Normal heart sounds.   Pulmonary:      Effort: Pulmonary effort is normal.      Breath sounds: Normal breath sounds.   Chest:   Breasts: Breasts are symmetrical.      Right: Normal. No swelling, bleeding, inverted nipple, mass, nipple discharge, skin change, tenderness or supraclavicular adenopathy.      Left: Normal. No swelling, bleeding, inverted nipple, mass, nipple discharge, skin change, tenderness or supraclavicular adenopathy.       Abdominal:      General: Abdomen is flat. Bowel sounds are normal.      Palpations: Abdomen is soft.      Tenderness: There is no abdominal tenderness.   Genitourinary:     General: Normal vulva.      Labia:         Right: No rash, tenderness, lesion or injury.         Left: No rash, tenderness, lesion or injury.       Urethra: No prolapse, urethral pain, urethral swelling or urethral lesion.      Vagina: Normal. No signs of injury. No vaginal discharge, erythema, tenderness, bleeding, lesions or prolapsed vaginal walls.      Cervix: No discharge, friability, lesion, erythema or cervical bleeding.      Uterus: Normal. Not enlarged, not tender and no uterine prolapse.       Adnexa: Right adnexa normal and left adnexa normal.        Right: No mass, tenderness or fullness.          Left: No mass, tenderness or fullness.        Rectum: Normal. No mass.   Musculoskeletal:      Cervical back: Normal range of motion and neck supple.   Lymphadenopathy:      Upper Body:      Right upper body: No supraclavicular adenopathy.      Left upper body: No supraclavicular adenopathy.   Skin:     General: Skin is warm and dry.   Neurological:      General: No focal deficit present.      Mental Status:  "She is alert and oriented to person, place, and time.   Psychiatric:         Mood and Affect: Mood normal.         Behavior: Behavior normal. Behavior is cooperative.         Thought Content: Thought content normal.         Judgment: Judgment normal.         /72   Ht 177.8 cm (70\")   Wt 68 kg (150 lb)   BMI 21.52 kg/m²     Assessment/Plan   Diagnoses and all orders for this visit:    1. Well woman exam with routine gynecological exam (Primary)  -     IGP, Apt HPV,rfx 16 / 18,45    2. Family history of colon cancer  -     Occult Blood, Fecal By Immunoassay - Stool, Per Rectum    3. Atypical squamous cells of undetermined significance (ASCUS) on Papanicolaou smear of cervix  -     IGP, Apt HPV,rfx 16 / 18,45    4. Screening for cervical cancer  -     IGP, Apt HPV,rfx 16 / 18,45    Other orders  -     estradiol (ESTRACE) 0.1 MG/GM vaginal cream; Apply vaginally 2-3 times per week  Dispense: 42.5 g; Refill: 4        All questions answered.  Breast self exam technique reviewed and patient encouraged to perform self-exam monthly.  Discussed healthy lifestyle modifications.  Recommended 30 minutes of aerobic exercise five times per week.  Discussed calcium needs to prevent osteoporosis.    -Pap smear today  -Fecal occult blood negative   -Up to date on mammo and colonoscopy  -Will repeat dexa next year                 "

## 2021-10-14 LAB — SPECIMEN STATUS: NORMAL

## 2021-10-15 LAB
CYTOLOGIST CVX/VAG CYTO: NORMAL
CYTOLOGY CVX/VAG DOC CYTO: NORMAL
CYTOLOGY CVX/VAG DOC THIN PREP: NORMAL
DX ICD CODE: NORMAL
HIV 1 & 2 AB SER-IMP: NORMAL
HPV I/H RISK 4 DNA CVX QL PROBE+SIG AMP: NEGATIVE
OTHER STN SPEC: NORMAL
STAT OF ADQ CVX/VAG CYTO-IMP: NORMAL

## 2021-11-01 ENCOUNTER — TELEPHONE (OUTPATIENT)
Dept: GASTROENTEROLOGY | Facility: CLINIC | Age: 61
End: 2021-11-01

## 2021-11-04 ENCOUNTER — TELEPHONE (OUTPATIENT)
Dept: GASTROENTEROLOGY | Facility: CLINIC | Age: 61
End: 2021-11-04

## 2021-11-04 DIAGNOSIS — Z83.71 FAMILY HISTORY OF POLYPS IN THE COLON: ICD-10-CM

## 2021-11-04 DIAGNOSIS — Z80.0 FAMILY HISTORY OF GI MALIGNANCY: ICD-10-CM

## 2021-11-04 DIAGNOSIS — K63.5 POLYP OF COLON, UNSPECIFIED PART OF COLON, UNSPECIFIED TYPE: Primary | ICD-10-CM

## 2021-11-04 NOTE — TELEPHONE ENCOUNTER
Last scope 9/17/18 in epic--Personal history of polyps--Family  history of polyps and colon cancer--Aspirin--Medications:          ASPIRIN 81 PO    calcium carbonate (OS-RAFAEL) 1250 (500 Ca) MG tablet    carvedilol (COREG) 3.125 MG tablet    estradiol (ESTRACE) 0.1 MG/GM vaginal cream    flecainide (TAMBOCOR) 50 MG tablet          Oa form scan in media

## 2021-11-10 ENCOUNTER — TELEPHONE (OUTPATIENT)
Dept: GASTROENTEROLOGY | Facility: CLINIC | Age: 61
End: 2021-11-10

## 2021-11-11 LAB — HEMOCCULT STL QL IA: NEGATIVE

## 2021-12-28 ENCOUNTER — TELEPHONE (OUTPATIENT)
Dept: GASTROENTEROLOGY | Facility: CLINIC | Age: 61
End: 2021-12-28

## 2021-12-28 NOTE — TELEPHONE ENCOUNTER
SW April at [facility] via fax for colonoscopy on 06/13/2022  arrive at 7am   . Mailed Prep instructions to Mailing address on-file. ----[employer]      Advised that [facility] will call with final arrival time minimum 24 hrs before procedure. IF they do not get a phone call, arrival time will stay the same as given on instructions

## 2021-12-28 NOTE — TELEPHONE ENCOUNTER
Returned PT phone call, no answer left vm. Advised that on June 6th dr polk will be in clinic and wont be doing procedures that day. Pt needs to call back to schedule

## 2021-12-28 NOTE — TELEPHONE ENCOUNTER
[place]. Will be back June 6th.  Wants to Schedule colonoscopy for June 6th  Any time is ok..Please call to confirm  [phone]  With just a yes or no.  Please leave a message.

## 2022-04-18 ENCOUNTER — TRANSCRIBE ORDERS (OUTPATIENT)
Dept: ADMINISTRATIVE | Facility: HOSPITAL | Age: 62
End: 2022-04-18

## 2022-04-18 DIAGNOSIS — Z01.818 PREOP TESTING: Primary | ICD-10-CM

## 2022-06-07 ENCOUNTER — TELEPHONE (OUTPATIENT)
Dept: GASTROENTEROLOGY | Facility: CLINIC | Age: 62
End: 2022-06-07

## 2022-06-07 ENCOUNTER — PROCEDURE VISIT (OUTPATIENT)
Dept: OBSTETRICS AND GYNECOLOGY | Age: 62
End: 2022-06-07

## 2022-06-07 ENCOUNTER — APPOINTMENT (OUTPATIENT)
Dept: WOMENS IMAGING | Facility: HOSPITAL | Age: 62
End: 2022-06-07

## 2022-06-07 ENCOUNTER — OFFICE VISIT (OUTPATIENT)
Dept: OBSTETRICS AND GYNECOLOGY | Age: 62
End: 2022-06-07

## 2022-06-07 VITALS
SYSTOLIC BLOOD PRESSURE: 114 MMHG | WEIGHT: 155 LBS | HEIGHT: 70 IN | DIASTOLIC BLOOD PRESSURE: 70 MMHG | BODY MASS INDEX: 22.19 KG/M2

## 2022-06-07 DIAGNOSIS — Z01.419 ENCOUNTER FOR GYNECOLOGICAL EXAMINATION: Primary | ICD-10-CM

## 2022-06-07 DIAGNOSIS — M85.80 OSTEOPENIA, SENILE: ICD-10-CM

## 2022-06-07 DIAGNOSIS — Z78.0 POST-MENOPAUSAL: Primary | ICD-10-CM

## 2022-06-07 DIAGNOSIS — Z01.818 PRE-OP TESTING: Primary | ICD-10-CM

## 2022-06-07 DIAGNOSIS — N95.2 ATROPHIC VAGINITIS: ICD-10-CM

## 2022-06-07 DIAGNOSIS — K59.00 CONSTIPATION, UNSPECIFIED CONSTIPATION TYPE: ICD-10-CM

## 2022-06-07 DIAGNOSIS — N94.10 FEMALE DYSPAREUNIA: ICD-10-CM

## 2022-06-07 DIAGNOSIS — Z12.31 BREAST CANCER SCREENING BY MAMMOGRAM: ICD-10-CM

## 2022-06-07 DIAGNOSIS — Z12.4 SCREENING FOR CERVICAL CANCER: ICD-10-CM

## 2022-06-07 PROCEDURE — 77067 SCR MAMMO BI INCL CAD: CPT | Performed by: RADIOLOGY

## 2022-06-07 PROCEDURE — 77063 BREAST TOMOSYNTHESIS BI: CPT | Performed by: RADIOLOGY

## 2022-06-07 PROCEDURE — 77080 DXA BONE DENSITY AXIAL: CPT | Performed by: OBSTETRICS & GYNECOLOGY

## 2022-06-07 PROCEDURE — 99396 PREV VISIT EST AGE 40-64: CPT | Performed by: OBSTETRICS & GYNECOLOGY

## 2022-06-07 PROCEDURE — 99212 OFFICE O/P EST SF 10 MIN: CPT | Performed by: OBSTETRICS & GYNECOLOGY

## 2022-06-07 RX ORDER — ESTRADIOL 0.1 MG/G
CREAM VAGINAL
Qty: 42.5 G | Refills: 4 | Status: SHIPPED | OUTPATIENT
Start: 2022-06-07

## 2022-06-07 NOTE — PROGRESS NOTES
Subjective   Chief Complaint   Patient presents with   • Gynecologic Exam     Annual:last pap 10/21,mammo 10/21,dexa here today,colonoscopy ,discuss tenderness on left side after intercourse      History of Present Illness    Xochilt Martinez is a very pleasant  61 y.o. female .  , Mammo Exam scheduled later this morning, , Exercise 7 times a week needs to add resistance she is basically just walking  Patient is here for annual exam but she has several problems she wanted to discuss including osteopenia, dyspareunia, left lower quadrant discomfort with constipation  Patient states she has had some alternating diarrhea and constipation she has a colonoscopy scheduled next week.  Does not always use the estrogen vaginal cream and dyspareunia does not feel like it did before she used the cream a little bit more located on the left side.  She is receiving wellness labs with her PCP.    Obstetric History:  OB History        4    Para   2    Term   2            AB   2    Living   2       SAB        IAB        Ectopic        Molar        Multiple        Live Births   2               Menstrual History:     No LMP recorded. Patient is postmenopausal.       Sexual History:       Past Medical History:   Diagnosis Date   • Anxiety and depression     Was on antidepresssants, ended about a year ago, .   • Atypical squamous cells of undetermined significance (ASCUS) on Papanicolaou smear of cervix , 2016    1998, ASCUS.  Subsequent Paps normal.  2016, ASCUS, positive HR-HPV, [id number] and 18.  Colpo biopsies negative.([place]).   • Colon polyps    • Diverticulosis of colon     Asymptomatic.   • Encounter for IUD removal 2014    Removed due to spotting with Mirena.  FSH = 50.6   • Eye injury     right eye (as a child)   • Family history of colon cancer     Father had colon ca in his early 50's.   • History of Papanicolaou smear of cervix [id number], Neg Pap, Neg HR-HPV.  , Neg Pap,  "Pos HPV, endometrial cells present.  2016, ASCUS, Pos HR-HPV, Neg Genotypes 16 and 18.  Colpo bx's= Neg ECC, Neg Bx's, normal endometrium.  2017 Neg Pap, Neg HR-HPV.  2018, ASCUS, Neg HR-HPV   • History of Papanicolaou smear of cervix [id number]    Pap smear Hx:[id number], Normal yearly Paps. 1998, ASCUS. 1998, '99X2, 7703-8316, Normal yearly Paps.   • Hx of bone density study 2018    2018: DEXA scan= moderate osteopenia in L1 and right femoral neck.  T scores: L1 -1.8; L2 -0.8; L3 -0.1; L4 by 0.4.  Right femoral neck -1.8; total right hip -1.9   2008 DEXA scan: Mild lumbar osteopenia and moderate to severe osteopenia and right femoral neck.  T scores: L1 -1.5; L2 -1.3; L3 -0.4; L4 -1.3.  Right femoral neck -2.0.  Total right femur -1.9.   • Insomnia     More of a \"bad sleeper\". Awakens early AM.   • PAF (paroxysmal atrial fibrillation) ([facility]) 08/25/2018    Sudden onset this year, uncertain etiology, except she was playin pickle ball, and was very dehydrated as evidenced by elevated BUN, creatinine, and hemoglobin.  She converted with IV fluids.  Placed on Low dose Toprolol, no anticoagulants.  Had follow-up evaluation by Dr. Saeed Barba.   • PCB (post coital bleeding) 2014    Intermittantly with IUD, Resolved after removal of IUD.   • PMB (postmenopausal bleeding) 2015    Endometrial biopsies= Normal. While in [place]. None since.   • Vaginal delivery     1988  \"JOAN\"    1992  \"PAUL\"     Past Surgical History:   Procedure Laterality Date   • BREAST BIOPSY Right 1989    20 yr ago, benign clogged duct.   • COLONOSCOPY W/ BIOPSIES  2013    x3: Started at age 38 with personal hx of polyps and family history. Last 'scope, Sept 17, 2018= No polyps.   • DENTAL PROCEDURE     • HIP SURGERY  1974    @ age 14: Fx hip: Placed hip pins, then removed three years later.   • TONSILLECTOMY  1964    Removed at 4 yoa.       Current Outpatient Medications:   •  ASPIRIN 81 PO, Take 81 mg by mouth Daily., Disp: , Rfl:   •  carvedilol " "(COREG) 3.125 MG tablet, , Disp: , Rfl:   •  estradiol (ESTRACE) 0.1 MG/GM vaginal cream, Apply vaginally 2-3 times per week, Disp: 42.5 g, Rfl: 4  •  flecainide (TAMBOCOR) 50 MG tablet, , Disp: , Rfl:   •  calcium carbonate (OS-RAFAEL) 1250 (500 Ca) MG tablet, Take 1 tablet by mouth Daily., Disp: , Rfl:    SOCIAL Hx:  [unfilled]    The following portions of the patient's history were reviewed and updated as appropriate: allergies, current medications, past family history, past medical history, past social history, past surgical history and problem list.    Review of Systems      Urinary incontinence assessment discussed      Except as outlined in history of physical illness, patient denies any changes in her GYN, , GI systems.  All other systems reviewed are negative         Objective   Physical Exam    /70   Ht 177.8 cm (70\")   Wt 70.3 kg (155 lb)   Breastfeeding No   BMI 22.24 kg/m²     General: Patient is alert and oriented and appears overall healthy  Neck: Is supple without thyromegaly, no carotid bruits and no lymphadenopathy  Lungs: Clear bilaterally, no wheezing, rhonchi, or rales.  Respiratory rate is normal  Breast: Even symmetrical, no lymphadenopathy, no retraction, no discharge ,no masses , lumps appreciated on either side  Heart: Regular rate and rhythm are appreciated, no murmurs or rubs are heard  Abdomen: Is soft, without organomegaly, bowel sounds are positive, there is no rebound or guarding and palpation does not produce any discomfort  Back: Nontender without CVA tenderness  Pelvic: External genitalia appear normal and consistent with mature female.  BUS normal                Urethra appears normal and without mass, bladder is nontender and without any lesions                        Urethral meatus is normal without scarring tenderness or masses                 Bladder is without tenderness or fullness                           Vagina is clean dry without discharge and , no lesions or " masses are present                         Cervix is noninflamed without discharge or lesions.  There is no cervical motion tenderness.                Uterus is nonenlarged, without tenderness, and no masses or abnormalities are  present               Adnexa are non-enlarged, non tender               Rectal digital  exam reveals adequate sphincter tone and no masses or lesions are appreciated on digital rectal examination.       Patient Active Problem List   Diagnosis   • Paroxysmal atrial fibrillation (HCC)   • Osteopenia of multiple sites   • Colon polyps   • Family history of colon cancer   • Atypical squamous cells of undetermined significance (ASCUS) on Papanicolaou smear of cervix   • Osteopenia, senile   • Constipation, unspecified constipation type   • Atrophic vaginitis                Assessment & Plan   Diagnoses and all orders for this visit:    1. Encounter for gynecological examination (Primary)  -     IGP, Apt HPV,rfx 16 / 18,45    2. Breast cancer screening by mammogram  -     Mammo Screening Digital Tomosynthesis Bilateral With CAD; Future    3. Screening for cervical cancer    4. Osteopenia, senile  -1.2, -2.3, recommend calcium 1200 daily with vitamin D 1 to 2000 units daily and talked about the importance of adding some resistance weightbearing exercise  5. Constipation, unspecified constipation type  Patient has an appointment with GI next week she has undergoing a colonoscopy  6. Female dyspareunia  Improved with use of estrogen cream her pelvic exam was normal but she would like to proceed with a pelvic ultrasound as she lives out of town and would like to have a imaging done.  We will try to work in this morning.  7. Atrophic vaginitis  Continue estrogen cream  Other orders  -     estradiol (ESTRACE) 0.1 MG/GM vaginal cream; Apply vaginally 2-3 times per week  Dispense: 42.5 g; Refill: 4      Discussed today's findings and concerns with patient.  Continue to recommend regular exercise  including cardiovascular and resistance training as well as  breast self-exam. Wellness lab, mammography, & pap smear, in accordance with age guidelines.    I have encouraged her to call for today's test results if she has not received them within 10 days.  Patient is advised to call with any change in her condition or with any other questions, otherwise return  for annual examination.

## 2022-06-10 ENCOUNTER — LAB (OUTPATIENT)
Dept: LAB | Facility: HOSPITAL | Age: 62
End: 2022-06-10

## 2022-06-10 DIAGNOSIS — Z01.818 PREOP TESTING: ICD-10-CM

## 2022-06-10 LAB
CYTOLOGIST CVX/VAG CYTO: NORMAL
CYTOLOGY CVX/VAG DOC CYTO: NORMAL
CYTOLOGY CVX/VAG DOC THIN PREP: NORMAL
DX ICD CODE: NORMAL
HIV 1 & 2 AB SER-IMP: NORMAL
HPV I/H RISK 4 DNA CVX QL PROBE+SIG AMP: NEGATIVE
OTHER STN SPEC: NORMAL
SARS-COV-2 ORF1AB RESP QL NAA+PROBE: NOT DETECTED
STAT OF ADQ CVX/VAG CYTO-IMP: NORMAL

## 2022-06-10 PROCEDURE — C9803 HOPD COVID-19 SPEC COLLECT: HCPCS

## 2022-06-10 PROCEDURE — U0004 COV-19 TEST NON-CDC HGH THRU: HCPCS

## 2022-06-13 ENCOUNTER — OUTSIDE FACILITY SERVICE (OUTPATIENT)
Dept: GASTROENTEROLOGY | Facility: CLINIC | Age: 62
End: 2022-06-13

## 2022-06-13 PROCEDURE — 45380 COLONOSCOPY AND BIOPSY: CPT | Performed by: INTERNAL MEDICINE

## 2022-06-20 ENCOUNTER — TELEPHONE (OUTPATIENT)
Dept: GASTROENTEROLOGY | Facility: CLINIC | Age: 62
End: 2022-06-20

## 2022-06-20 NOTE — TELEPHONE ENCOUNTER
"No other advice regarding treatment of the \"extreme constipation alternating with extreme diarrhea\".  Would manage with fiber as this seems to be effective.  As long as she is taking a fiber supplement she does not need a high-fiber diet.  "

## 2022-06-20 NOTE — TELEPHONE ENCOUNTER
VM to pt with request to contact office.     C/s for 6/13/27 placed in recall and HM.  O/v for 6/13/23 placed in recall.

## 2022-06-20 NOTE — TELEPHONE ENCOUNTER
"Call from pt.  Advise per path report that random colon bx's unremarkable.  Polyp that was removed was not cancerous and not precancerous.      Advise per DR Lobato note.  Verb understanding.     Pt states had been struggling with \"extreme constipation alternating with extreme diarrhea\".  PCP has placed on metamucil with good control of these symptoms.      Asking if any other recommendations - dietary or otherwise - from DR Lobato.      States ok to leave VM.   "

## 2022-06-20 NOTE — TELEPHONE ENCOUNTER
----- Message from Audie WU MD sent at 6/17/2022  4:15 PM EDT -----  Regarding: Biopsy results  Okay to call results, recommend follow-up colonoscopy in 5 years.  Office follow-up annually but call sooner as needed.  ----- Message -----  From: Interface, Scans Incoming  Sent: 6/17/2022   7:52 AM EDT  To: Audie WU MD

## 2023-07-27 DIAGNOSIS — Z12.31 SCREENING MAMMOGRAM FOR BREAST CANCER: Primary | ICD-10-CM

## 2023-09-25 ENCOUNTER — TELEPHONE (OUTPATIENT)
Dept: OBSTETRICS AND GYNECOLOGY | Age: 63
End: 2023-09-25

## 2023-09-25 ENCOUNTER — HOSPITAL ENCOUNTER (OUTPATIENT)
Facility: HOSPITAL | Age: 63
Discharge: HOME | End: 2023-09-25
Admitting: OBSTETRICS & GYNECOLOGY
Payer: MEDICARE

## 2023-09-25 ENCOUNTER — OFFICE VISIT (OUTPATIENT)
Dept: OBSTETRICS AND GYNECOLOGY | Age: 63
End: 2023-09-25

## 2023-09-25 VITALS
WEIGHT: 154 LBS | SYSTOLIC BLOOD PRESSURE: 120 MMHG | DIASTOLIC BLOOD PRESSURE: 70 MMHG | HEIGHT: 70 IN | BODY MASS INDEX: 22.05 KG/M2

## 2023-09-25 DIAGNOSIS — Z01.419 WELL WOMAN EXAM WITH ROUTINE GYNECOLOGICAL EXAM: Primary | ICD-10-CM

## 2023-09-25 DIAGNOSIS — Z11.51 SCREENING FOR HPV (HUMAN PAPILLOMAVIRUS): ICD-10-CM

## 2023-09-25 DIAGNOSIS — R87.610 ATYPICAL SQUAMOUS CELLS OF UNDETERMINED SIGNIFICANCE (ASCUS) ON PAPANICOLAOU SMEAR OF CERVIX: ICD-10-CM

## 2023-09-25 DIAGNOSIS — Z12.31 SCREENING MAMMOGRAM FOR BREAST CANCER: ICD-10-CM

## 2023-09-25 DIAGNOSIS — M85.89 OSTEOPENIA OF MULTIPLE SITES: ICD-10-CM

## 2023-09-25 DIAGNOSIS — Z12.31 ENCOUNTER FOR SCREENING MAMMOGRAM FOR MALIGNANT NEOPLASM OF BREAST: ICD-10-CM

## 2023-09-25 DIAGNOSIS — Z12.4 ENCOUNTER FOR PAPANICOLAOU SMEAR FOR CERVICAL CANCER SCREENING: ICD-10-CM

## 2023-09-25 PROCEDURE — 77067 SCR MAMMO BI INCL CAD: CPT

## 2023-09-25 PROCEDURE — 77063 BREAST TOMOSYNTHESIS BI: CPT

## 2023-09-25 RX ORDER — ESTRADIOL 0.1 MG/G
CREAM VAGINAL
Qty: 42.5 G | Refills: 4 | Status: SHIPPED | OUTPATIENT
Start: 2023-09-25

## 2023-09-25 RX ORDER — MELATONIN
2 TIMES DAILY
COMMUNITY
Start: 2021-10-21

## 2023-09-25 NOTE — TELEPHONE ENCOUNTER
"    Caller: Xochilt Martinez \"KARO\"    Relationship: Self    Best call back number: [phone]    Requested Prescriptions: ESTRACE  Requested Prescriptions      No prescriptions requested or ordered in this encounter        Pharmacy where request should be sent: [facility] [place] [address] [facility] - [phone]  - [phone] FX     Last office visit with prescribing clinician: Visit date not found   Last telemedicine visit with prescribing clinician: Visit date not found   Next office visit with prescribing clinician: 9/25/2023     Additional details provided by patient: PATIENT IS ONLY HERE FOR A SHORT TIME TO GET CAUGHT UP ON HER APPOINTMENTS AND IS COMING IN TODAY WOULD LIKE TO BE ABLE TO  THE CREAM AFTER HER VISITS TODAY SINCE SHE ISNT STAYING IN THE ARE[place]//WAS HOPING TO GET THE CREAM CALLED IN BEFORE THE VISIT SO IT WILL BE READY THIS AFTERNOON    Does the patient have less than a 3 day supply:  [x] Yes  [] No    Would you like a call back once the refill request has been completed: [x] Yes [] No    If the office needs to give you a call back, can they leave a voicemail: [x] Yes [] No    Enrique Rowley Rep   09/25/23 11:05 EDT           "

## 2023-09-25 NOTE — PROGRESS NOTES
"Subjective     Chief Complaint   Patient presents with    Gynecologic Exam     Annual exam last pap 2022 neg/neg, m/g done today, dexa , csc        History of Present Illness    Xochilt Martinez is a 63 y.o.  who presents for annual exam.    She is living in [place] now  Lives in [place]  Got  and moved up there  Has been there 2 years but did live there for 4 years    Diagnosed with hypercalcemia and hyperparathyroidism  Managed by provider in [place]  Has copy of most recent DEXA here, osteopenia    No bladder issues, just using restroom at night    Obstetric History:  OB History          4    Para   2    Term   2            AB   2    Living   2         SAB        IAB        Ectopic        Molar        Multiple        Live Births   2               Menstrual History:     No LMP recorded. Patient is postmenopausal.         Current contraception: post menopausal status  History of abnormal Pap smear: yes -   Received Gardasil immunization: no  Perform regular self breast exam: yes -   Family history of uterine or ovarian cancer: no  Family History of colon cancer: no  Family history of breast cancer: no    Mammogram: done today.  Colonoscopy: up to date.  DEXA: up to date.    Exercise: moderately active  Calcium/Vitamin D: adequate intake    The following portions of the patient's history were reviewed and updated as appropriate: allergies, current medications, past family history, past medical history, past social history, past surgical history, and problem list.    Review of Systems    Review of Systems   Constitutional: Negative for fatigue.   Respiratory: Negative for shortness of breath.    Gastrointestinal: Negative for abdominal pain.   Genitourinary: Negative for dysuria.   Neurological: Negative for headaches.   Psychiatric/Behavioral: Negative for dysphoric mood.         Objective   Physical Exam    /70   Ht 177.8 cm (70\")   Wt 69.9 kg (154 lb)   BMI " 22.10 kg/m²   General:   alert, comfortable, and no distress   Heart: regular rate and rhythm   Lungs: clear to auscultation bilaterally   Breast: normal appearance, no masses or tenderness, Inspection negative, No nipple retraction or dimpling, No nipple discharge or bleeding, No axillary or supraclavicular adenopathy, Normal to palpation without dominant masses   Neck: no adenopathy and no carotid bruit   Abdomen: normal findings: soft, non-tender   CVA: Not performed today   Pelvis: External genitalia: normal general appearance  Urinary system: urethral meatus normal  Vaginal: atrophic mucosa  Cervix: normal appearance and thin prep PAP obtained  Adnexa: normal bimanual exam and non palpable  Uterus: normal single, nontender  Bladder: wnl   Extremities: Not performed today   Neurologic: negative   Psychiatric: Normal affect, judgement, and mood     Assessment & Plan   Diagnoses and all orders for this visit:    1. Well woman exam with routine gynecological exam (Primary)    2. Osteopenia of multiple sites    3. Atypical squamous cells of undetermined significance (ASCUS) on Papanicolaou smear of cervix    4. Screening for HPV (human papillomavirus)  -     IGP, Aptima HPV, Rfx 16 / 18,45    5. Encounter for Papanicolaou smear for cervical cancer screening  -     IGP, Aptima HPV, Rfx 16 / 18,45    6. Encounter for screening mammogram for malignant neoplasm of breast  -     Mammo Screening Digital Tomosynthesis Bilateral With CAD; Future    Other orders  -     estradiol (ESTRACE) 0.1 MG/GM vaginal cream; Apply vaginally 2-3 times per week  Dispense: 42.5 g; Refill: 4        All questions answered.  Breast self exam technique reviewed and patient encouraged to perform self-exam monthly.  Discussed healthy lifestyle modifications.  Recommended 30 minutes of aerobic exercise five times per week.  Discussed calcium needs to prevent osteoporosis.    Pap done d/t h/o abnormal  Estrace refilled  DEXA utd-will have  providers in [place] manage this  Does have family h/o colon cancer, HO given on genetic testing-last CSC was in 2022, due in 5 years

## 2023-09-27 LAB
CYTOLOGIST CVX/VAG CYTO: NORMAL
CYTOLOGY CVX/VAG DOC CYTO: NORMAL
CYTOLOGY CVX/VAG DOC THIN PREP: NORMAL
DX ICD CODE: NORMAL
HIV 1 & 2 AB SER-IMP: NORMAL
HPV GENOTYPE REFLEX: NORMAL
HPV I/H RISK 4 DNA CVX QL PROBE+SIG AMP: NEGATIVE
OTHER STN SPEC: NORMAL
STAT OF ADQ CVX/VAG CYTO-IMP: NORMAL

## 2023-09-27 RX ORDER — ESTRADIOL 0.1 MG/G
CREAM VAGINAL
Qty: 42.5 G | Refills: 4 | Status: CANCELLED | OUTPATIENT
Start: 2023-09-27

## 2023-10-17 ENCOUNTER — TELEPHONE (OUTPATIENT)
Dept: OBSTETRICS AND GYNECOLOGY | Age: 63
End: 2023-10-17

## 2023-10-17 NOTE — TELEPHONE ENCOUNTER
"    Caller: Xochilt Martinez \"KARO\"    Relationship: Self    Best call back number: [phone] CALL ANYTIME, IT IS OKAY TO LVM.    Caller requesting test results: PATIENT    What test was performed: PAP    When was the test performed: 09/25/23    Where was the test performed: WITH ANNUAL APPT    Additional notes: PATIENT IS REQUESTING A CALL BACK TO DISCUSS PAP RESULTS.    PATIENT HAS VERY DENSE BREAST AND HAS QUESTIONS ABOUT DENSE BREAST TISSUE. PATIENT WAS TOLD DENSE BREAST TISSUE AND CANCER CELLS APPEAR WHITE ON MAMMOGRAM. IS THERE ANOTHER TEST OR DIFFERENT IMAGING FOR DENSE BREAST TISSUE?     PATIENT'S SISTER WAS DIAGNOSED WITH BREAST CANCER IN HER 50'S. SHOULD PATIENT TAKE THAT INTO CONSIDERATION IN REGARDS TO FUTURE MAMMOGRAMS OR TESTS?        "

## 2023-10-17 NOTE — TELEPHONE ENCOUNTER
Pt would like to discuss how that dense breasts show up white on mammograms and so does cancer she feels like that could be giving her a false sense of security would like to discuss more.    Her sister did not have testing done.

## 2023-10-26 DIAGNOSIS — R92.30 DENSE BREAST TISSUE ON MAMMOGRAM, UNSPECIFIED TYPE: Primary | ICD-10-CM

## 2023-11-13 RX ORDER — FLUCONAZOLE 150 MG/1
150 TABLET ORAL ONCE
Qty: 1 TABLET | Refills: 0 | Status: SHIPPED | OUTPATIENT
Start: 2023-11-13 | End: 2023-11-13

## 2023-11-30 ENCOUNTER — TELEPHONE (OUTPATIENT)
Dept: OBSTETRICS AND GYNECOLOGY | Age: 63
End: 2023-11-30
Payer: COMMERCIAL

## 2023-11-30 NOTE — TELEPHONE ENCOUNTER
"PROVIDER: ETHAN RAM    Caller: Xochilt Martinez \"KARO\"    Relationship: Self    Best call back number: [phone] CAN CALL PT ANYTIME AND CAN LVM IF NA AND CAN SEND MSG VIA [employer] IF NEED BE    What does billing need from the patient: PT RECD BILL FROM [facility] & IT WAS DENIED DUE TO \"MEDICARE HAS DENIED PYMT STATING THAT THESE ARE NON-COVERED SERVICES, B/C THIS IS A ROUTINE EXAM OR SCREENING PROCEDURE DONE IN CONJUNCTION WITH A ROUTINE EXAM\" IT INDICATES THAT IT IS FOR  LOOKS LIKE IT PAID FOR PART OF IT.   PROCEDURE CODE 72982 DETECTION TEST BY NUCLEIC ACID FOR HPV  BILL IS $88.90 FOR 9-25-23    BILLING ADV HER TO CALL THE OFFICE.            "

## 2023-12-08 NOTE — TELEPHONE ENCOUNTER
"  Caller: Xochilt Martinez \"KARO\"    Relationship: Self    Best call back number: [phone]      PATIENT CALLED WANTING TO THANK LESLEY FOR CALLING [facility] AND MAKING SURE CLAIM WAS RESUBMITTED. PATIENT SAID, THANK YOU FOR GOING ABOVE AND BEYOND!         "

## 2023-12-13 ENCOUNTER — TELEPHONE (OUTPATIENT)
Dept: OBSTETRICS AND GYNECOLOGY | Age: 63
End: 2023-12-13
Payer: COMMERCIAL

## 2023-12-13 NOTE — TELEPHONE ENCOUNTER
Provider: RONNY CHAMBERS    Caller: KARO BOLDENKELSEY    Phone Number: [phone] / LVM    Reason for Call:  PT WAS SEEN AT THE [facility] ON 12/01/2023 - RONNY CHAMBERS SENT U/S ORDERS TO [facility] in [place], [place] - U/S IS COMPLETE - THE OFFICE WILL NOT RELEASE THE RESULTS TO [facility]ILLALTA UNTIL THE OFFICE CONTACT THEM - PHONE NUMBERS AS FOLLOWS: RADIOLOGY DEPT # [phone] - HEALTH RECORDS # [phone] - PLEASE CONTACT THE PT WITH ANY QUESTION - PT HAS BEEN FIGHTING WITH THEM TO GET THE RESULTS SENT TO [facility]ILLALTA    THANK YOU!

## 2023-12-15 NOTE — TELEPHONE ENCOUNTER
PT IS CALLING TO CHECK THE STATUS OF THE REQUEST FOR OUR OFFICE TO OBTAIN HER BREAST U/S FROM [place] PLEASE CALL

## 2023-12-15 NOTE — TELEPHONE ENCOUNTER
Called again, spoke with medical records they sent those out by mail on 12/13/2023  .    Pt notified;.

## 2024-01-30 RX ORDER — FLUCONAZOLE 150 MG/1
150 TABLET ORAL ONCE
Qty: 1 TABLET | Refills: 0 | Status: SHIPPED | OUTPATIENT
Start: 2024-01-30 | End: 2024-01-30 | Stop reason: SDUPTHER

## 2024-01-30 RX ORDER — FLUCONAZOLE 150 MG/1
150 TABLET ORAL ONCE
Qty: 1 TABLET | Refills: 0 | Status: SHIPPED | OUTPATIENT
Start: 2024-01-30 | End: 2024-01-30

## 2024-08-19 ENCOUNTER — TELEPHONE (OUTPATIENT)
Dept: OBSTETRICS AND GYNECOLOGY | Age: 64
End: 2024-08-19
Payer: COMMERCIAL

## 2024-08-19 DIAGNOSIS — Z78.0 POST-MENOPAUSAL: Primary | ICD-10-CM

## 2024-08-19 NOTE — TELEPHONE ENCOUNTER
Caller: TASHIA HILL    Relationship: SELF    Best call back number: [phone] (home)       What orders are you requesting (i.e. lab or imaging): DEXA    In what timeframe would the patient need to come in: 10/01      Additional notes: PT HAS HER MAMMO AND ANNUAL SCHEDULED WITH  ON 10/01. SHE LIVES IN [place] SO SHE GETS ALL OF HER APPTS ON THE SAME DAY. SHE WANTED TO KNOW IF SHE CAN ALSO GET A DEXA DONE THAT DAY BEFORE HER MAMMO. OR DIRECTLY AFTER SEEING DR HENAO. SHE NEEDS TO BE OUT OF THE OFFICE BY 12 PM BECAUSE SHE DOES HAVE APPTS ELSEWHERE THAT DAY WHILE SHE IS HERE. LAST DEXA WAS 06/2022. SHE STATES HE HAS HAD HER PARATHYROIDS REMOVED BECAUSE THEY WAS PULLING CALCIUM FROM HER BONES. CAN LEAVE A DETAILED VOICEMAIL.

## 2024-10-01 ENCOUNTER — HOSPITAL ENCOUNTER (OUTPATIENT)
Facility: HOSPITAL | Age: 64
Discharge: HOME | End: 2024-10-01
Payer: MEDICARE

## 2024-10-01 ENCOUNTER — OFFICE VISIT (OUTPATIENT)
Dept: OBSTETRICS AND GYNECOLOGY | Age: 64
End: 2024-10-01
Payer: MEDICARE

## 2024-10-01 VITALS
HEIGHT: 70 IN | DIASTOLIC BLOOD PRESSURE: 74 MMHG | SYSTOLIC BLOOD PRESSURE: 120 MMHG | WEIGHT: 156 LBS | BODY MASS INDEX: 22.33 KG/M2

## 2024-10-01 DIAGNOSIS — Z87.42 HISTORY OF ABNORMAL CERVICAL PAP SMEAR: ICD-10-CM

## 2024-10-01 DIAGNOSIS — Z78.0 POST-MENOPAUSAL: ICD-10-CM

## 2024-10-01 DIAGNOSIS — N95.2 ATROPHIC VAGINITIS: Primary | ICD-10-CM

## 2024-10-01 DIAGNOSIS — Z12.31 BREAST CANCER SCREENING BY MAMMOGRAM: ICD-10-CM

## 2024-10-01 DIAGNOSIS — Z12.4 SCREENING FOR CERVICAL CANCER: ICD-10-CM

## 2024-10-01 DIAGNOSIS — Z12.31 ENCOUNTER FOR SCREENING MAMMOGRAM FOR MALIGNANT NEOPLASM OF BREAST: ICD-10-CM

## 2024-10-01 DIAGNOSIS — Z01.419 ENCOUNTER FOR GYNECOLOGICAL EXAMINATION: ICD-10-CM

## 2024-10-01 PROCEDURE — 77080 DXA BONE DENSITY AXIAL: CPT

## 2024-10-01 PROCEDURE — 77063 BREAST TOMOSYNTHESIS BI: CPT

## 2024-10-01 PROCEDURE — 77067 SCR MAMMO BI INCL CAD: CPT

## 2024-10-01 RX ORDER — BLOOD-GLUCOSE METER
EACH MISCELLANEOUS
COMMUNITY
Start: 2024-06-05

## 2024-10-01 RX ORDER — LANCETS 33 GAUGE
EACH MISCELLANEOUS
COMMUNITY
Start: 2024-06-05

## 2024-10-01 RX ORDER — ESTRADIOL 0.1 MG/G
CREAM VAGINAL
Qty: 42.5 G | Refills: 4 | Status: SHIPPED | OUTPATIENT
Start: 2024-10-01

## 2024-10-01 RX ORDER — METOPROLOL SUCCINATE 25 MG/1
12.5 TABLET, EXTENDED RELEASE ORAL DAILY
COMMUNITY
Start: 2024-08-15

## 2024-10-01 RX ORDER — APIXABAN 5 MG/1
5 TABLET, FILM COATED ORAL
COMMUNITY
Start: 2024-03-01

## 2024-10-01 NOTE — PROGRESS NOTES
Subjective   Chief Complaint   Patient presents with    Gynecologic Exam     Annual:last pap ,mammo and dexa today,colonoscopy ,needs refill on Estradiol cream,send to [facility]      History of Present Illness  Wellness exam  Xochilt Martinez is a very pleasant  64 y.o. female .  , Mammo Exam today, , Exercise 5 times a week  Elena is still living in [place] she is here on a visit  She is known to have atrophic vaginitis but the estrogen cream she is using might be close to 2 years old  She states she has had some recurrent yeast infections and brought some reports but interestingly the microbiology on  did not have any WBCs and only showed normal fabien.  The report from February 10 only showed a rare yeast mostly mixed normal fabien.  So it is not clear whether or not she is actually having yeast infections.  Elena is receiving wellness labs from her PCP and endocrinologist and she also sees a cardiologist.  She remains postmenopausal    A mammogram and DEXA scan as well as Pap smear are performed today.    Obstetric History:  OB History          4    Para   2    Term   2            AB   2    Living   2         SAB        IAB        Ectopic        Molar        Multiple        Live Births   2               Menstrual History:     No LMP recorded. Patient is postmenopausal.       Sexual History:       Past Medical History:   Diagnosis Date    Atypical squamous cells of undetermined significance (ASCUS) on Papanicolaou smear of cervix , 2016    1998, ASCUS.  Subsequent Paps normal.  2016, ASCUS, positive HR-HPV, [id number] and 18.  Colpo biopsies negative.([place]).    Colon polyps     Diverticulosis of colon     Asymptomatic.    Encounter for IUD removal 2014    Removed due to spotting with Mirena.  FSH = 50.6    Eye injury     right eye (as a child)    Family history of colon cancer     Father had colon ca in his early 50's.    History of Papanicolaou smear of cervix [id number]  "   2014, Neg Pap, Neg HR-HPV.  2015, Neg Pap, Pos HPV, endometrial cells present.  2016, ASCUS, Pos HR-HPV, Neg Genotypes 16 and 18.  Colpo bx's= Neg ECC, Neg Bx's, normal endometrium.  2017 Neg Pap, Neg HR-HPV.  2018, ASCUS, Neg HR-HPV    History of Papanicolaou smear of cervix [id number]    Pap smear Hx:2868-1448, Normal yearly Paps. 1998, ASCUS. 1998, '99X2, 8494-8381, Normal yearly Paps.    Hx of bone density study 2018    2018: DEXA scan= moderate osteopenia in L1 and right femoral neck.  T scores: L1 -1.8; L2 -0.8; L3 -0.1; L4 by 0.4.  Right femoral neck -1.8; total right hip -1.9   2008 DEXA scan: Mild lumbar osteopenia and moderate to severe osteopenia and right femoral neck.  T scores: L1 -1.5; L2 -1.3; L3 -0.4; L4 -1.3.  Right femoral neck -2.0.  Total right femur -1.9.    Insomnia     More of a \"bad sleeper\". Awakens early AM.    PAF (paroxysmal atrial fibrillation) 08/25/2018    Sudden onset this year, uncertain etiology, except she was playin pickle ball, and was very dehydrated as evidenced by elevated BUN, creatinine, and hemoglobin.  She converted with IV fluids.  Placed on Low dose Toprolol, no anticoagulants.  Had follow-up evaluation by Dr. Saeed Barba.    PCB (post coital bleeding) 2014    Intermittantly with IUD, Resolved after removal of IUD.    PMB (postmenopausal bleeding) 2015    Endometrial biopsies= Normal. While in [place]. None since.    Vaginal delivery     1988  \"JOAN\"    1992  \"PAUL\"     Past Surgical History:   Procedure Laterality Date    BREAST BIOPSY Right 1989    20 yr ago, benign clogged duct.    COLONOSCOPY W/ BIOPSIES  2013    x3: Started at age 38 with personal hx of polyps and family history. Last 'scope, Sept 17, 2018= No polyps.    DENTAL PROCEDURE      HIP SURGERY  1974    @ age 14: Fx hip: Placed hip pins, then removed three years later.    TONSILLECTOMY  1964    Removed at 4 yoa.       Current Outpatient Medications:     Blood Glucose Monitoring Suppl (ONE TOUCH " "ULTRA 2) w/Device kit, USE TO TEST BLOOD GLUCOSE 2 TIMES DAILY, Disp: , Rfl:     calcium carbonate (OS-RAFAEL) 1250 (500 Ca) MG tablet, Take 1 tablet by mouth Daily., Disp: , Rfl:     cholecalciferol (VITAMIN D3) 25 MCG (1000 UT) tablet, Take  by mouth 2 (Two) Times a Day., Disp: , Rfl:     Eliquis 5 MG tablet tablet, Take 1 tablet by mouth., Disp: , Rfl:     estradiol (ESTRACE) 0.1 MG/GM vaginal cream, Apply vaginally 2-3 times per week, Disp: 42.5 g, Rfl: 4    flecainide (TAMBOCOR) 50 MG tablet, 2 (Two) Times a Day., Disp: , Rfl:     glucose blood test strip, Use to test blood glucoses 2 times a day when having symptoms of hypoglycemia, Disp: , Rfl:     KRILL OIL PO, Take 350 mg by mouth Daily., Disp: , Rfl:     Lancets (OneTouch Delica Plus [id number]) misc, USE TO TEST BLOOD GLUCOSE 2 TIMES DAILY, Disp: , Rfl:     Metoprolol-HCTZ ER 25-12.5 MG tablet sustained-release 24 hour, , Disp: , Rfl:     metoprolol succinate XL (TOPROL-XL) 25 MG 24 hr tablet, Take 0.5 tablets by mouth Daily. (Patient not taking: Reported on 10/1/2024), Disp: , Rfl:    SOCIAL Hx:  [unfilled]    The following portions of the patient's history were reviewed and updated as appropriate: allergies, current medications, past family history, past medical history, past social history, past surgical history and problem list.    Review of Systems      Urinary incontinence assessment discussed      Except as outlined in history of physical illness, patient denies any changes in her GYN, , GI systems.  All other systems reviewed are negative         Objective   Physical Exam    /74   Ht 177.8 cm (70\")   Wt 70.8 kg (156 lb)   BMI 22.38 kg/m²     General: Patient is alert and oriented and appears overall healthy  Neck: Is supple without thyromegaly, no carotid bruits and no lymphadenopathy  Lungs: Clear bilaterally, no wheezing, rhonchi, or rales.  Respiratory rate is normal  Breast: Even symmetrical, no lymphadenopathy, no retraction, no " discharge ,no masses or lumps appreciated on either side  Heart: Regular rate and rhythm are appreciated, no murmurs or rubs are heard  Abdomen: Is soft, without organomegaly, bowel sounds are positive, there is no rebound or guarding and palpation does not produce any discomfort  Back: Nontender without CVA tenderness  Pelvic: External genitalia appear normal and consistent with mature female.  BUS normal                Urethra appears normal and without mass, bladder is nontender and without any lesions                        Urethral meatus is normal without scarring tenderness or masses                 Bladder is without tenderness or fullness                           Vagina is clean dry without discharge and , no lesions or masses are present.  There is no evidence of yeast.  She has mild vaginal atrophy there are no ulcerations vesicles or any other abnormality                         Cervix is noninflamed without discharge or lesions.  There is no cervical motion tenderness.                Uterus is nonenlarged, without tenderness, and no masses or abnormalities are  present               Adnexa are non-enlarged, non tender               Rectal digital  exam reveals adequate sphincter tone and no masses or lesions are appreciated on digital rectal examination.       Patient Active Problem List   Diagnosis    Paroxysmal atrial fibrillation    Osteopenia of multiple sites    Colon polyps    Family history of colon cancer    Atypical squamous cells of undetermined significance (ASCUS) on Papanicolaou smear of cervix    Osteopenia, senile    Constipation, unspecified constipation type    Atrophic vaginitis                Assessment & Plan   Diagnoses and all orders for this visit:    1. Atrophic vaginitis (Primary)  -     IGP, Apt HPV,rfx 16 / 18,45    2. Breast cancer screening by mammogram  -     Mammo Screening Digital Tomosynthesis Bilateral With CAD; Future  -     IGP, Apt HPV,rfx 16 / 18,45    3. Screening  for cervical cancer  -     IGP, Apt HPV,rfx 16 / 18,45    4. Encounter for gynecological examination  -     IGP, Apt HPV,rfx 16 / 18,45    5. History of abnormal cervical Pap smear  -     IGP, Apt HPV,rfx 16 / 18,45    Other orders  -     estradiol (ESTRACE) 0.1 MG/GM vaginal cream; Apply vaginally 2-3 times per week  Dispense: 42.5 g; Refill: 4       Discussed today's findings and concerns with patient.  Continue to recommend regular exercise including cardiovascular and resistance training as well as  breast self-exam. Wellness lab, mammography, & pap smear, in accordance with age guidelines.    I have encouraged her to call for today's test results if she has not received them within 10 days.  Patient is advised to call with any change in her condition or with any other questions, otherwise return  for annual examination.

## 2024-10-07 LAB
CYTOLOGIST CVX/VAG CYTO: NORMAL
CYTOLOGY CVX/VAG DOC CYTO: NORMAL
CYTOLOGY CVX/VAG DOC THIN PREP: NORMAL
DX ICD CODE: NORMAL
HPV I/H RISK 4 DNA CVX QL PROBE+SIG AMP: NEGATIVE
Lab: NORMAL
OTHER STN SPEC: NORMAL
STAT OF ADQ CVX/VAG CYTO-IMP: NORMAL

## 2025-01-27 NOTE — PROGRESS NOTES
" GYN PROBLEM EXAM                                    2019    Subjective   Xochilt Whittington is a 58 y.o.  Female,      Chief complaint:  Abnormal Pap Smear (Repeat pap smear 2018 pap = ASCUS, negative HR-HPV.  , colpo bx and ecc = negative. )    History of Present Illness: Xochilt had a Pap in 2018 with ASCUS, Neg HR-HPV.  She is here for a six-month follow-up.    Pap smear Hx:4594-1462, Normal yearly Paps.   , ASCUS.   , '99X2, 8169-1835, Normal yearly Paps.  , Neg Pap, Neg HR-HPV.    , Neg Pap, Pos HPV, endometrial cells present.    , ASCUS, Pos HR-HPV, Neg Genotypes 16 and 18.  Colpo bx's= Neg ECC, Neg Bx's, normal endometrium.     Neg Pap, Neg HR-HPV.    , ASCUS, Neg HR-HPV.    We reviewed Pap smear \"101\" again.  Drawings made.    The following portions of the patient's history were reviewed / updated as appropriate:   allergies, current medications,  past medical history, past surgical history, past family history, past social history, and problem list.    Review of Systems:     /74   Ht 177.8 cm (70\")   Wt 67.6 kg (149 lb)   Breastfeeding? No   BMI 21.38 kg/m²     Objective   OBGyn Exam     PHYSICAL EXAM      Well-developed, well-nourished, thin  female, in no distress, alert and well oriented ×3.         Pelvic exam :  Vulva normal.           External genitalia normal.  BUS negative.             Introitus normal.  Vaginal mucosa looks well estrogenized.          Cervix normal, Pap with HPV.           Bimanual exam deferred.                 Mood and affect is normal.  Behavior normal.  Thought content normal.            Judgment normal.         Assessment/Plan   Xochilt was seen today for abnormal pap smear.    Diagnoses and all orders for this visit:    Encounter for repeat Papanicolaou smear of cervix    ASCUS of cervix with negative high risk HPV    Screening for HPV (human papillomavirus)      COMMENTS: If this Pap " smear is normal, will then see Dr. Godinez for her annual exam and mammogram in October.    Good Grewal MD  5/9/2019       Stable.

## 2025-03-31 ENCOUNTER — TELEPHONE (OUTPATIENT)
Dept: OBSTETRICS AND GYNECOLOGY | Age: 65
End: 2025-03-31
Payer: COMMERCIAL

## 2025-05-05 ENCOUNTER — TELEPHONE (OUTPATIENT)
Dept: OBSTETRICS AND GYNECOLOGY | Age: 65
End: 2025-05-05

## 2025-05-05 NOTE — TELEPHONE ENCOUNTER
"    Caller: Xochilt Martinez \"KARO\"    Relationship to patient: Self    Best call back number: [phone]     Chief complaint: RESCHEDULE MAMMOGRAM     Type of visit: MAMMOGRAM    Requested date: ANNUAL HAS BEEN RESCHEDULED FOR 10/13/25 AT 9:00 AM WITH ANTOINE GOMEZ PA-C    If rescheduling, when is the original appointment: 10/06/25    Additional notes:PATIENT IS REQUESTING A CALL BACK TO CONFIRM MAMMOGRAM WILL BE MOVED TO 10/13/25. PATIENT WILL ONLY BE IN TOWN 10/13 OR 10/08.        "

## 2025-05-05 NOTE — TELEPHONE ENCOUNTER
"          Caller: Xochilt Martinez \"KARO\"    Relationship to patient: Self    Best call back number: [phone]      Patient is needing: PATIENT WOULD LIKE TO R/S MAMMOGRAM FOR 8:15AM ON 10/13/25             "